# Patient Record
Sex: FEMALE | Race: BLACK OR AFRICAN AMERICAN | NOT HISPANIC OR LATINO | Employment: OTHER | URBAN - METROPOLITAN AREA
[De-identification: names, ages, dates, MRNs, and addresses within clinical notes are randomized per-mention and may not be internally consistent; named-entity substitution may affect disease eponyms.]

---

## 2017-02-08 ENCOUNTER — PROCEDURE VISIT (OUTPATIENT)
Dept: NEUROLOGY | Facility: CLINIC | Age: 47
End: 2017-02-08

## 2017-02-08 DIAGNOSIS — R20.2 NUMBNESS AND TINGLING: Primary | ICD-10-CM

## 2017-02-08 DIAGNOSIS — R20.0 NUMBNESS AND TINGLING: Primary | ICD-10-CM

## 2017-02-08 NOTE — PROGRESS NOTES
Procedure   EMG & Nerve Conduction Test  Date/Time: 2/8/2017 10:47 AM  Performed by: MILAD LAI  Authorized by: MILAD LAI   Consent: Verbal consent obtained.                      Diagnoses and all orders for this visit:    Numbness and tingling  -     EMG & Nerve Conduction Test

## 2017-02-09 ENCOUNTER — PROCEDURE VISIT (OUTPATIENT)
Dept: NEUROLOGY | Facility: CLINIC | Age: 47
End: 2017-02-09

## 2017-02-09 DIAGNOSIS — R20.0 NUMBNESS AND TINGLING: Primary | ICD-10-CM

## 2017-02-09 DIAGNOSIS — R20.2 NUMBNESS AND TINGLING: Primary | ICD-10-CM

## 2017-02-09 PROCEDURE — 95886 MUSC TEST DONE W/N TEST COMP: CPT | Performed by: PSYCHIATRY & NEUROLOGY

## 2017-02-09 PROCEDURE — 95911 NRV CNDJ TEST 9-10 STUDIES: CPT | Performed by: PSYCHIATRY & NEUROLOGY

## 2017-02-09 NOTE — PROGRESS NOTES
"Procedure   EMG & Nerve Conduction Test  Date/Time: 2/9/2017 8:17 AM  Performed by: MILAD LAI  Authorized by: MILAD LAI   Consent: Verbal consent obtained.            [facility]   [address], [address]  [place]  [address]   Phone: [phone]  FAX: [phone]           Patient: lisbet dan YOB: 1970  Gender: Female  Reason for study: see below in history section      Visit Date: 2/9/2017 07:22  Age: 46 Years 7 Months Old  Examining Physician: Holly       The patient has a chief complaint and history of bilateral, upper extremity, numbness,    The patient is referred to have this problem evaluated today with EMG and nerve conduction studies.  The performing physician also acted as a technician on this study.  Please note that in the table \"NR\" stands for \"no response\" therefore testing was performed, but no response was elicited.    A brief neurological exam, revealed no abnormalities in muscle bulk strength reflexes and sensation.      Sensory NCS      Nerve / Sites Rec. Site Distance Onset Lat NP Amp Onset Gama     cm ms µV m/s   L Median, Ulnar - Ring finger comparison      Median Wrist Ring finger 14 3.9 1.2 36      Ulnar Wrist Ring finger 14 2.7 2.8 52   L Median, Radial - Thumb comparison      Median Wrist Thumb 10 4.4 1.3 23      Radial Wrist Thumb 10 1.8 14.1 56           R Median, Radial - Thumb comparison      Median Wrist Thumb 10 2.4 1.2 42      Radial Wrist Thumb 10 1.9 6.2 52       Motor NCS      Nerve / Sites Muscle Distance Latency Amplitude Velocity     cm ms mV m/s   L Median - APB      Wrist APB 8 6.93 5.0       Elbow APB 21 10.26 4.8 63   R Median - APB      Wrist APB 8 7.71 4.6       Elbow APB 21 11.61 4.6 54   L Ulnar - ADM      Wrist ADM 8 3.02 6.7       B.Elbow ADM 12 5.36 6.5 51      A.Elbow ADM 20 8.85 6.1 57   R Ulnar - ADM      Wrist ADM 8 2.97 6.7       B.Elbow ADM 12 5.21 6.5 54      A.Elbow ADM 20 8.70 6.1 57       F  " Wave      Nerve F-min    ms   R Ulnar - ADM 32       EMG         EMG Summary Table     Spontaneous MUAP Recruitment    IA Fib PSW Fasc H.F. Amp Dur. PPP Pattern   L. ABD POLL BREVIS N None None None None N N N N   L. FIRST D INTEROSS N None None None None N N N N   L. FLEX POLL LONG N None None None None N N N N   L. PRON QUAD N None None None None N N N N   L. PRON TERES N None None None None N N N N   L. TRICEPS N None None None None N N N N   L. DELTOID N None None None None N N N N   L. CERV PSPINAL N None None None None N N N N   R. CERV PSPINAL N None None None None N N N N   R. TRICEPS N None None None None N N N N   R. DELTOID N None None None None N N N N   R. ABD POLL BREVIS N None None None None N N N N   R. FIRST D INTEROSS N None None None None N N N N   R. FLEX POLL LONG N None None None None N N N N   R. PRON QUAD N None None None None N N N N   R. PRON TERES N None None None None N N N N     1.) SNAPs showed increased radial to median latency difference (>0.4m/s)  2.) CMAPs showed slowing of the median nerves over the wrist  3.) F-waves were normal where recordable  4.) EMG was normal in all muscles tested.    These findings are compatible with    1.) moderate bilateral carpal tunnel syndrome    There is currently no electrodiagnostic evidence of a cervical radiculopathy        All NCS were performed at 32C or greater.    The referring health care provider will discuss this report and possible further diagnostic and management options with the patient.      Akbar Barrera M.D.                       Diagnoses and all orders for this visit:    Numbness and tingling  -     EMG & Nerve Conduction Test

## 2017-06-09 ENCOUNTER — TRANSCRIBE ORDERS (OUTPATIENT)
Dept: ADMINISTRATIVE | Facility: HOSPITAL | Age: 47
End: 2017-06-09

## 2017-06-09 DIAGNOSIS — R94.39 ABNORMAL STRESS TEST: Primary | ICD-10-CM

## 2017-06-16 ENCOUNTER — HOSPITAL ENCOUNTER (OUTPATIENT)
Facility: HOSPITAL | Age: 47
Setting detail: HOSPITAL OUTPATIENT SURGERY
Discharge: HOME | End: 2017-06-16
Attending: INTERNAL MEDICINE | Admitting: INTERNAL MEDICINE

## 2017-06-16 VITALS
RESPIRATION RATE: 18 BRPM | WEIGHT: 283.73 LBS | TEMPERATURE: 96.7 F | HEIGHT: 64 IN | SYSTOLIC BLOOD PRESSURE: 130 MMHG | DIASTOLIC BLOOD PRESSURE: 102 MMHG | HEART RATE: 64 BPM | BODY MASS INDEX: 48.44 KG/M2 | OXYGEN SATURATION: 99 %

## 2017-06-16 DIAGNOSIS — R94.39 ABNORMAL STRESS TEST: ICD-10-CM

## 2017-06-16 LAB
ANION GAP SERPL CALCULATED.3IONS-SCNC: 5 MMOL/L (ref 3–11)
BUN BLD-MCNC: 9 MG/DL (ref 9–23)
BUN/CREAT SERPL: 11.3 (ref 7–25)
CALCIUM SPEC-SCNC: 9.4 MG/DL (ref 8.7–10.4)
CHLORIDE SERPL-SCNC: 108 MMOL/L (ref 99–109)
CO2 SERPL-SCNC: 28 MMOL/L (ref 20–31)
CREAT BLD-MCNC: 0.8 MG/DL (ref 0.6–1.3)
DEPRECATED RDW RBC AUTO: 44.8 FL (ref 37–54)
ERYTHROCYTE [DISTWIDTH] IN BLOOD BY AUTOMATED COUNT: 13.8 % (ref 11.3–14.5)
GFR SERPL CREATININE-BSD FRML MDRD: 94 ML/MIN/1.73
GLUCOSE BLD-MCNC: 131 MG/DL (ref 70–100)
GLUCOSE BLDC GLUCOMTR-MCNC: 122 MG/DL (ref 70–130)
HCT VFR BLD AUTO: 39.1 % (ref 34.5–44)
HGB BLD-MCNC: 12.3 G/DL (ref 11.5–15.5)
MCH RBC QN AUTO: 27.7 PG (ref 27–31)
MCHC RBC AUTO-ENTMCNC: 31.5 G/DL (ref 32–36)
MCV RBC AUTO: 88.1 FL (ref 80–99)
PLATELET # BLD AUTO: 273 10*3/MM3 (ref 150–450)
PMV BLD AUTO: 10.9 FL (ref 6–12)
POTASSIUM BLD-SCNC: 2.7 MMOL/L (ref 3.5–5.5)
RBC # BLD AUTO: 4.44 10*6/MM3 (ref 3.89–5.14)
SODIUM BLD-SCNC: 141 MMOL/L (ref 132–146)
WBC NRBC COR # BLD: 9.02 10*3/MM3 (ref 3.5–10.8)

## 2017-06-16 PROCEDURE — 93458 L HRT ARTERY/VENTRICLE ANGIO: CPT | Performed by: INTERNAL MEDICINE

## 2017-06-16 PROCEDURE — 82962 GLUCOSE BLOOD TEST: CPT

## 2017-06-16 PROCEDURE — 85027 COMPLETE CBC AUTOMATED: CPT | Performed by: INTERNAL MEDICINE

## 2017-06-16 PROCEDURE — C1894 INTRO/SHEATH, NON-LASER: HCPCS | Performed by: INTERNAL MEDICINE

## 2017-06-16 PROCEDURE — 0 IOPAMIDOL PER 1 ML: Performed by: INTERNAL MEDICINE

## 2017-06-16 PROCEDURE — 25010000002 FENTANYL CITRATE (PF) 100 MCG/2ML SOLUTION: Performed by: INTERNAL MEDICINE

## 2017-06-16 PROCEDURE — C1769 GUIDE WIRE: HCPCS | Performed by: INTERNAL MEDICINE

## 2017-06-16 PROCEDURE — 80048 BASIC METABOLIC PNL TOTAL CA: CPT | Performed by: INTERNAL MEDICINE

## 2017-06-16 PROCEDURE — 25010000002 MIDAZOLAM PER 1 MG: Performed by: INTERNAL MEDICINE

## 2017-06-16 PROCEDURE — 36415 COLL VENOUS BLD VENIPUNCTURE: CPT

## 2017-06-16 RX ORDER — ACETAMINOPHEN 325 MG/1
650 TABLET ORAL EVERY 4 HOURS PRN
Status: DISCONTINUED | OUTPATIENT
Start: 2017-06-16 | End: 2017-06-16 | Stop reason: HOSPADM

## 2017-06-16 RX ORDER — MIDAZOLAM HYDROCHLORIDE 1 MG/ML
INJECTION INTRAMUSCULAR; INTRAVENOUS AS NEEDED
Status: DISCONTINUED | OUTPATIENT
Start: 2017-06-16 | End: 2017-06-16 | Stop reason: HOSPADM

## 2017-06-16 RX ORDER — TOPIRAMATE 50 MG/1
50 TABLET, FILM COATED ORAL DAILY
COMMUNITY
End: 2020-08-11

## 2017-06-16 RX ORDER — PROPRANOLOL HYDROCHLORIDE 10 MG/1
10 TABLET ORAL DAILY PRN
COMMUNITY
End: 2019-07-29

## 2017-06-16 RX ORDER — NALOXONE HCL 0.4 MG/ML
0.4 VIAL (ML) INJECTION
Status: DISCONTINUED | OUTPATIENT
Start: 2017-06-16 | End: 2017-06-16 | Stop reason: HOSPADM

## 2017-06-16 RX ORDER — MORPHINE SULFATE 2 MG/ML
1 INJECTION, SOLUTION INTRAMUSCULAR; INTRAVENOUS EVERY 4 HOURS PRN
Status: DISCONTINUED | OUTPATIENT
Start: 2017-06-16 | End: 2017-06-16 | Stop reason: HOSPADM

## 2017-06-16 RX ORDER — LIDOCAINE HYDROCHLORIDE 10 MG/ML
INJECTION, SOLUTION INFILTRATION; PERINEURAL AS NEEDED
Status: DISCONTINUED | OUTPATIENT
Start: 2017-06-16 | End: 2017-06-16 | Stop reason: HOSPADM

## 2017-06-16 RX ORDER — TIZANIDINE 4 MG/1
4 TABLET ORAL NIGHTLY PRN
COMMUNITY
End: 2022-06-15

## 2017-06-16 RX ORDER — FENTANYL CITRATE 50 UG/ML
INJECTION, SOLUTION INTRAMUSCULAR; INTRAVENOUS AS NEEDED
Status: DISCONTINUED | OUTPATIENT
Start: 2017-06-16 | End: 2017-06-16 | Stop reason: HOSPADM

## 2017-06-16 RX ORDER — SODIUM CHLORIDE 9 MG/ML
250 INJECTION, SOLUTION INTRAVENOUS CONTINUOUS
Status: DISCONTINUED | OUTPATIENT
Start: 2017-06-16 | End: 2017-06-16

## 2017-06-16 RX ORDER — ONDANSETRON 4 MG/1
4 TABLET, FILM COATED ORAL EVERY 6 HOURS PRN
COMMUNITY
End: 2020-10-14

## 2017-06-16 RX ORDER — ALPRAZOLAM 0.5 MG/1
0.5 TABLET ORAL DAILY PRN
COMMUNITY
End: 2020-08-11

## 2017-06-16 RX ORDER — DEXTROSE MONOHYDRATE 25 G/50ML
25 INJECTION, SOLUTION INTRAVENOUS
Status: DISCONTINUED | OUTPATIENT
Start: 2017-06-16 | End: 2017-06-16 | Stop reason: HOSPADM

## 2017-06-16 RX ORDER — ASPIRIN 325 MG
325 TABLET, DELAYED RELEASE (ENTERIC COATED) ORAL DAILY
Status: DISCONTINUED | OUTPATIENT
Start: 2017-06-16 | End: 2017-06-16 | Stop reason: HOSPADM

## 2017-06-16 RX ORDER — LOSARTAN POTASSIUM AND HYDROCHLOROTHIAZIDE 25; 100 MG/1; MG/1
1 TABLET ORAL DAILY
COMMUNITY

## 2017-06-16 RX ORDER — GABAPENTIN 800 MG/1
800 TABLET ORAL 2 TIMES DAILY
COMMUNITY

## 2017-06-16 RX ORDER — PRAVASTATIN SODIUM 40 MG
40 TABLET ORAL DAILY
COMMUNITY
End: 2019-07-29

## 2017-06-16 RX ORDER — TEMAZEPAM 7.5 MG/1
7.5 CAPSULE ORAL NIGHTLY PRN
Status: DISCONTINUED | OUTPATIENT
Start: 2017-06-16 | End: 2017-06-16 | Stop reason: HOSPADM

## 2017-06-16 RX ORDER — ALPRAZOLAM 0.25 MG/1
0.25 TABLET ORAL 3 TIMES DAILY PRN
Status: DISCONTINUED | OUTPATIENT
Start: 2017-06-16 | End: 2017-06-16 | Stop reason: HOSPADM

## 2017-06-16 RX ORDER — FLUTICASONE PROPIONATE 50 MCG
2 SPRAY, SUSPENSION (ML) NASAL DAILY
COMMUNITY

## 2017-06-16 RX ORDER — POTASSIUM CHLORIDE 750 MG/1
60 CAPSULE, EXTENDED RELEASE ORAL ONCE
Status: COMPLETED | OUTPATIENT
Start: 2017-06-16 | End: 2017-06-16

## 2017-06-16 RX ORDER — RANITIDINE 150 MG/1
150 TABLET ORAL DAILY
COMMUNITY
End: 2020-10-14

## 2017-06-16 RX ORDER — NICOTINE POLACRILEX 4 MG
15 LOZENGE BUCCAL
Status: DISCONTINUED | OUTPATIENT
Start: 2017-06-16 | End: 2017-06-16 | Stop reason: HOSPADM

## 2017-06-16 RX ORDER — TRAZODONE HYDROCHLORIDE 50 MG/1
50 TABLET ORAL NIGHTLY
COMMUNITY
End: 2022-06-15

## 2017-06-16 RX ORDER — VENLAFAXINE 37.5 MG/1
37.5 TABLET ORAL DAILY
COMMUNITY
End: 2020-08-11

## 2017-06-16 RX ORDER — DILTIAZEM HYDROCHLORIDE 60 MG/1
60 TABLET, FILM COATED ORAL DAILY
COMMUNITY

## 2017-06-16 RX ORDER — HYDROCODONE BITARTRATE AND ACETAMINOPHEN 5; 325 MG/1; MG/1
1 TABLET ORAL EVERY 4 HOURS PRN
Status: DISCONTINUED | OUTPATIENT
Start: 2017-06-16 | End: 2017-06-16 | Stop reason: HOSPADM

## 2017-06-16 RX ORDER — OXYCODONE HYDROCHLORIDE 10 MG/1
10 TABLET ORAL EVERY 8 HOURS PRN
COMMUNITY

## 2017-06-16 RX ORDER — ESTRADIOL 1 MG/1
1 TABLET ORAL DAILY
COMMUNITY

## 2017-06-16 RX ORDER — POTASSIUM CHLORIDE 1.5 G/1.77G
60 POWDER, FOR SOLUTION ORAL ONCE
Status: DISCONTINUED | OUTPATIENT
Start: 2017-06-16 | End: 2017-06-16 | Stop reason: SDUPTHER

## 2017-06-16 RX ADMIN — POTASSIUM CHLORIDE 60 MEQ: 750 CAPSULE, EXTENDED RELEASE ORAL at 08:36

## 2017-06-16 RX ADMIN — ASPIRIN 325 MG: 325 TABLET, DELAYED RELEASE ORAL at 08:36

## 2017-06-16 NOTE — PLAN OF CARE
Problem: Patient Care Overview (Adult)  Goal: Plan of Care Review  Outcome: Ongoing (interventions implemented as appropriate)    06/16/17 1243   Coping/Psychosocial Response Interventions   Plan Of Care Reviewed With patient;family   Patient Care Overview   Progress improving   Outcome Evaluation   Outcome Summary/Follow up Plan PT AND FAMILY ABLE TO VERBALIZE UNDERSTANDING OF DC INSTRUCTIONS- NO QUESTIONS AT THIS TIME- SITE STABLE AND AMBULATING WITH NO ISSUES.       Goal: Discharge Needs Assessment  Outcome: Ongoing (interventions implemented as appropriate)    06/16/17 1243   Discharge Needs Assessment   Concerns To Be Addressed no discharge needs identified         Problem: Cardiac Catheterization with/without PCI (Adult)  Goal: Signs and Symptoms of Listed Potential Problems Will be Absent or Manageable (Cardiac Catheterization with/without PCI)  Outcome: Ongoing (interventions implemented as appropriate)    06/16/17 1243   Cardiac Catheterization with/without PCI   Problems Assessed (Cardiac Catheterization) all   Problems Present (Cardiac Catheterization) none

## 2019-07-29 ENCOUNTER — APPOINTMENT (OUTPATIENT)
Dept: GENERAL RADIOLOGY | Facility: HOSPITAL | Age: 49
End: 2019-07-29

## 2019-07-29 ENCOUNTER — APPOINTMENT (OUTPATIENT)
Dept: CT IMAGING | Facility: HOSPITAL | Age: 49
End: 2019-07-29

## 2019-07-29 ENCOUNTER — HOSPITAL ENCOUNTER (EMERGENCY)
Facility: HOSPITAL | Age: 49
Discharge: HOME | End: 2019-07-29
Attending: EMERGENCY MEDICINE | Admitting: EMERGENCY MEDICINE

## 2019-07-29 VITALS
SYSTOLIC BLOOD PRESSURE: 120 MMHG | HEIGHT: 64 IN | HEART RATE: 72 BPM | WEIGHT: 278 LBS | OXYGEN SATURATION: 99 % | TEMPERATURE: 98.7 F | RESPIRATION RATE: 17 BRPM | DIASTOLIC BLOOD PRESSURE: 73 MMHG | BODY MASS INDEX: 47.46 KG/M2

## 2019-07-29 DIAGNOSIS — R07.9 CHEST PAIN, UNSPECIFIED TYPE: Primary | ICD-10-CM

## 2019-07-29 LAB
ALBUMIN SERPL-MCNC: 3.9 G/DL (ref 3.5–5.2)
ALBUMIN/GLOB SERPL: 1.1 G/DL
ALP SERPL-CCNC: 81 U/L (ref 39–117)
ALT SERPL W P-5'-P-CCNC: 19 U/L (ref 1–33)
ANION GAP SERPL CALCULATED.3IONS-SCNC: 13 MMOL/L (ref 5–15)
AST SERPL-CCNC: 21 U/L (ref 1–32)
BASOPHILS # BLD AUTO: 0.05 10*3/MM3 (ref 0–0.2)
BASOPHILS NFR BLD AUTO: 0.6 % (ref 0–1.5)
BILIRUB SERPL-MCNC: 0.3 MG/DL (ref 0.2–1.2)
BUN BLD-MCNC: 10 MG/DL (ref 6–20)
BUN/CREAT SERPL: 12.2 (ref 7–25)
CALCIUM SPEC-SCNC: 9.6 MG/DL (ref 8.6–10.5)
CHLORIDE SERPL-SCNC: 102 MMOL/L (ref 98–107)
CO2 SERPL-SCNC: 24 MMOL/L (ref 22–29)
CREAT BLD-MCNC: 0.82 MG/DL (ref 0.57–1)
DEPRECATED RDW RBC AUTO: 43.6 FL (ref 37–54)
EOSINOPHIL # BLD AUTO: 0.16 10*3/MM3 (ref 0–0.4)
EOSINOPHIL NFR BLD AUTO: 2 % (ref 0.3–6.2)
ERYTHROCYTE [DISTWIDTH] IN BLOOD BY AUTOMATED COUNT: 13.2 % (ref 12.3–15.4)
GFR SERPL CREATININE-BSD FRML MDRD: 91 ML/MIN/1.73
GLOBULIN UR ELPH-MCNC: 3.7 GM/DL
GLUCOSE BLD-MCNC: 132 MG/DL (ref 65–99)
HCT VFR BLD AUTO: 41.1 % (ref 34–46.6)
HGB BLD-MCNC: 12.7 G/DL (ref 12–15.9)
HOLD SPECIMEN: NORMAL
IMM GRANULOCYTES # BLD AUTO: 0.02 10*3/MM3 (ref 0–0.05)
IMM GRANULOCYTES NFR BLD AUTO: 0.2 % (ref 0–0.5)
LIPASE SERPL-CCNC: 29 U/L (ref 13–60)
LYMPHOCYTES # BLD AUTO: 3.08 10*3/MM3 (ref 0.7–3.1)
LYMPHOCYTES NFR BLD AUTO: 38.3 % (ref 19.6–45.3)
MCH RBC QN AUTO: 27.6 PG (ref 26.6–33)
MCHC RBC AUTO-ENTMCNC: 30.9 G/DL (ref 31.5–35.7)
MCV RBC AUTO: 89.3 FL (ref 79–97)
MONOCYTES # BLD AUTO: 0.64 10*3/MM3 (ref 0.1–0.9)
MONOCYTES NFR BLD AUTO: 8 % (ref 5–12)
NEUTROPHILS # BLD AUTO: 4.09 10*3/MM3 (ref 1.7–7)
NEUTROPHILS NFR BLD AUTO: 50.9 % (ref 42.7–76)
NRBC BLD AUTO-RTO: 0 /100 WBC (ref 0–0.2)
NT-PROBNP SERPL-MCNC: 113.5 PG/ML (ref 5–450)
PLATELET # BLD AUTO: 326 10*3/MM3 (ref 140–450)
PMV BLD AUTO: 11 FL (ref 6–12)
POTASSIUM BLD-SCNC: 3.2 MMOL/L (ref 3.5–5.2)
PROT SERPL-MCNC: 7.6 G/DL (ref 6–8.5)
RBC # BLD AUTO: 4.6 10*6/MM3 (ref 3.77–5.28)
SODIUM BLD-SCNC: 139 MMOL/L (ref 136–145)
TROPONIN T SERPL-MCNC: <0.01 NG/ML (ref 0–0.03)
TROPONIN T SERPL-MCNC: <0.01 NG/ML (ref 0–0.03)
WBC NRBC COR # BLD: 8.04 10*3/MM3 (ref 3.4–10.8)
WHOLE BLOOD HOLD SPECIMEN: NORMAL
WHOLE BLOOD HOLD SPECIMEN: NORMAL

## 2019-07-29 PROCEDURE — 93005 ELECTROCARDIOGRAM TRACING: CPT | Performed by: EMERGENCY MEDICINE

## 2019-07-29 PROCEDURE — 80053 COMPREHEN METABOLIC PANEL: CPT | Performed by: EMERGENCY MEDICINE

## 2019-07-29 PROCEDURE — 83690 ASSAY OF LIPASE: CPT | Performed by: EMERGENCY MEDICINE

## 2019-07-29 PROCEDURE — 96375 TX/PRO/DX INJ NEW DRUG ADDON: CPT

## 2019-07-29 PROCEDURE — 25010000002 ONDANSETRON PER 1 MG: Performed by: EMERGENCY MEDICINE

## 2019-07-29 PROCEDURE — 96374 THER/PROPH/DIAG INJ IV PUSH: CPT

## 2019-07-29 PROCEDURE — 85025 COMPLETE CBC W/AUTO DIFF WBC: CPT | Performed by: EMERGENCY MEDICINE

## 2019-07-29 PROCEDURE — 83880 ASSAY OF NATRIURETIC PEPTIDE: CPT | Performed by: EMERGENCY MEDICINE

## 2019-07-29 PROCEDURE — 71045 X-RAY EXAM CHEST 1 VIEW: CPT

## 2019-07-29 PROCEDURE — 99285 EMERGENCY DEPT VISIT HI MDM: CPT

## 2019-07-29 PROCEDURE — 84484 ASSAY OF TROPONIN QUANT: CPT | Performed by: EMERGENCY MEDICINE

## 2019-07-29 RX ORDER — NITROGLYCERIN 0.4 MG/1
0.4 TABLET SUBLINGUAL
Status: DISCONTINUED | OUTPATIENT
Start: 2019-07-29 | End: 2019-07-29 | Stop reason: HOSPADM

## 2019-07-29 RX ORDER — NITROGLYCERIN 0.4 MG/1
0.4 TABLET SUBLINGUAL
Qty: 90 TABLET | Refills: 0 | Status: SHIPPED | OUTPATIENT
Start: 2019-07-29

## 2019-07-29 RX ORDER — SODIUM CHLORIDE 0.9 % (FLUSH) 0.9 %
10 SYRINGE (ML) INJECTION AS NEEDED
Status: DISCONTINUED | OUTPATIENT
Start: 2019-07-29 | End: 2019-07-29 | Stop reason: HOSPADM

## 2019-07-29 RX ORDER — ATORVASTATIN CALCIUM 40 MG/1
40 TABLET, FILM COATED ORAL DAILY
COMMUNITY
End: 2020-10-14

## 2019-07-29 RX ORDER — ONDANSETRON 2 MG/ML
4 INJECTION INTRAMUSCULAR; INTRAVENOUS ONCE
Status: COMPLETED | OUTPATIENT
Start: 2019-07-29 | End: 2019-07-29

## 2019-07-29 RX ORDER — ASPIRIN 81 MG/1
324 TABLET, CHEWABLE ORAL ONCE
Status: COMPLETED | OUTPATIENT
Start: 2019-07-29 | End: 2019-07-29

## 2019-07-29 RX ORDER — ALUMINA, MAGNESIA, AND SIMETHICONE 2400; 2400; 240 MG/30ML; MG/30ML; MG/30ML
15 SUSPENSION ORAL ONCE
Status: COMPLETED | OUTPATIENT
Start: 2019-07-29 | End: 2019-07-29

## 2019-07-29 RX ORDER — FAMOTIDINE 10 MG/ML
20 INJECTION, SOLUTION INTRAVENOUS ONCE
Status: COMPLETED | OUTPATIENT
Start: 2019-07-29 | End: 2019-07-29

## 2019-07-29 RX ORDER — LIDOCAINE HYDROCHLORIDE 20 MG/ML
15 SOLUTION OROPHARYNGEAL ONCE
Status: COMPLETED | OUTPATIENT
Start: 2019-07-29 | End: 2019-07-29

## 2019-07-29 RX ADMIN — FAMOTIDINE 20 MG: 10 INJECTION, SOLUTION INTRAVENOUS at 12:44

## 2019-07-29 RX ADMIN — NITROGLYCERIN 0.4 MG: 0.4 TABLET SUBLINGUAL at 14:50

## 2019-07-29 RX ADMIN — ASPIRIN 81 MG 324 MG: 81 TABLET ORAL at 12:09

## 2019-07-29 RX ADMIN — ALUMINUM HYDROXIDE, MAGNESIUM HYDROXIDE, AND DIMETHICONE 15 ML: 400; 400; 40 SUSPENSION ORAL at 12:40

## 2019-07-29 RX ADMIN — LIDOCAINE HYDROCHLORIDE 15 ML: 20 SOLUTION ORAL; TOPICAL at 12:41

## 2019-07-29 RX ADMIN — ONDANSETRON 4 MG: 2 INJECTION INTRAMUSCULAR; INTRAVENOUS at 12:42

## 2020-08-04 ENCOUNTER — TRANSCRIBE ORDERS (OUTPATIENT)
Dept: ADMINISTRATIVE | Facility: HOSPITAL | Age: 50
End: 2020-08-04

## 2020-08-04 DIAGNOSIS — R51.9 NONINTRACTABLE HEADACHE, UNSPECIFIED CHRONICITY PATTERN, UNSPECIFIED HEADACHE TYPE: Primary | ICD-10-CM

## 2020-08-04 DIAGNOSIS — R29.6 FALLS FREQUENTLY: ICD-10-CM

## 2020-08-05 ENCOUNTER — APPOINTMENT (OUTPATIENT)
Dept: MRI IMAGING | Facility: HOSPITAL | Age: 50
End: 2020-08-05

## 2020-08-11 ENCOUNTER — OFFICE VISIT (OUTPATIENT)
Dept: NEUROLOGY | Facility: CLINIC | Age: 50
End: 2020-08-11

## 2020-08-11 VITALS
TEMPERATURE: 97.1 F | HEIGHT: 64 IN | WEIGHT: 239 LBS | DIASTOLIC BLOOD PRESSURE: 78 MMHG | OXYGEN SATURATION: 100 % | SYSTOLIC BLOOD PRESSURE: 118 MMHG | HEART RATE: 69 BPM | BODY MASS INDEX: 40.8 KG/M2

## 2020-08-11 DIAGNOSIS — G47.33 OBSTRUCTIVE SLEEP APNEA: ICD-10-CM

## 2020-08-11 DIAGNOSIS — G44.209 TENSION HEADACHE: Primary | ICD-10-CM

## 2020-08-11 DIAGNOSIS — G93.2 IIH (IDIOPATHIC INTRACRANIAL HYPERTENSION): ICD-10-CM

## 2020-08-11 PROCEDURE — 99204 OFFICE O/P NEW MOD 45 MIN: CPT | Performed by: PSYCHIATRY & NEUROLOGY

## 2020-08-11 RX ORDER — TOPIRAMATE 50 MG/1
50 TABLET, FILM COATED ORAL 2 TIMES DAILY
Qty: 60 TABLET | Refills: 5 | Status: SHIPPED | OUTPATIENT
Start: 2020-08-11 | End: 2020-10-14 | Stop reason: SDUPTHER

## 2020-08-11 RX ORDER — METHYLPREDNISOLONE 4 MG/1
TABLET ORAL
Qty: 1 EACH | Refills: 0 | Status: SHIPPED | OUTPATIENT
Start: 2020-08-11 | End: 2020-10-14

## 2020-08-11 RX ORDER — EZETIMIBE 10 MG/1
10 TABLET ORAL DAILY
COMMUNITY
End: 2022-08-19

## 2020-08-11 RX ORDER — AMITRIPTYLINE HYDROCHLORIDE 10 MG/1
10 TABLET, FILM COATED ORAL NIGHTLY
COMMUNITY

## 2020-08-11 NOTE — PROGRESS NOTES
Subjective:    CC: Kelsi Costa is seen today in consultation at the request of Celeste Sadler* for Headache (NP)       HPI:  50-year-old female with a past medical history of arthritis, chronic neck and low back pain, anxiety, hypertension, hyperlipidemia, diabetes mellitus type 2 presents with headaches.  As per patient she started having headaches in the past however a few months ago they started to get worse.  She has also been having dizziness and balance problems.  Currently has a headache about 3-4 times a week which is either on the right side or all over, dull in nature with photophobia but no nausea or vomiting.  She typically takes an Excedrin for the pain.  Also has blurred vision in the mornings as well as ringing in her ears even when she does not have a headache.  Was diagnosed with pseudotumor cerebri several years ago and underwent her last lumbar puncture about 6 years ago.  However she states that excessive spinal fluid was taken out at that time which caused her to have side effects.  Was started on Topamax and is currently taking only 50 mg day as well as amitriptyline 10 mg nightly.  Also snores at night with excessive fatigue and daytime somnolence.  Is on morphine and oxycodone for her low back pain in addition to muscle relaxants and gabapentin for her neuropathy.  Last A1c was 6.8.  Has a MRI brain pending.    The following portions of the patient's history were reviewed today and updated as of 08/11/2020  : allergies, current medications, past family history, past medical history, past social history, past surgical history and problem list  These document will be scanned to patient's chart.      Current Outpatient Medications:   •  amitriptyline (ELAVIL) 10 MG tablet, Take 10 mg by mouth Every Night., Disp: , Rfl:   •  atorvastatin (LIPITOR) 40 MG tablet, Take 40 mg by mouth Daily., Disp: , Rfl:   •  budesonide (RHINOCORT ALLERGY) 32 MCG/ACT nasal spray, 1 spray into each  nostril 2 (Two) Times a Day., Disp: , Rfl:   •  diltiaZEM (CARDIZEM) 60 MG tablet, Take 60 mg by mouth Daily., Disp: , Rfl:   •  estradiol (ESTRACE) 1 MG tablet, Take 1 mg by mouth Daily., Disp: , Rfl:   •  ezetimibe (ZETIA) 10 MG tablet, Take 10 mg by mouth Daily., Disp: , Rfl:   •  fluticasone (FLONASE) 50 MCG/ACT nasal spray, 2 sprays into each nostril Daily., Disp: , Rfl:   •  Fluticasone Furoate-Vilanterol (BREO ELLIPTA) 200-25 MCG/INH aerosol powder , Inhale 2 puffs Daily., Disp: , Rfl:   •  gabapentin (NEURONTIN) 800 MG tablet, Take 800 mg by mouth 2 (two) times a day., Disp: , Rfl:   •  losartan-hydrochlorothiazide (HYZAAR) 100-25 MG per tablet, Take 1 tablet by mouth Daily., Disp: , Rfl:   •  metFORMIN (GLUCOPHAGE) 500 MG tablet, Take 500 mg by mouth 2 (Two) Times a Day With Meals., Disp: , Rfl:   •  Morphine Sulfate ER 15 MG tablet extended-release, Take 1 tablet by mouth Daily., Disp: , Rfl:   •  nitroglycerin (NITROSTAT) 0.4 MG SL tablet, Place 1 tablet under the tongue Every 5 (Five) Minutes As Needed for Chest Pain. Take no more than 3 doses in 15 minutes., Disp: 90 tablet, Rfl: 0  •  ondansetron (ZOFRAN) 4 MG tablet, Take 4 mg by mouth Every 6 (Six) Hours As Needed for Nausea or Vomiting., Disp: , Rfl:   •  oxyCODONE (ROXICODONE) 10 MG tablet, Take 10 mg by mouth Every 8 (Eight) Hours As Needed for Moderate Pain (4-6)., Disp: , Rfl:   •  raNITIdine (ZANTAC) 150 MG tablet, Take 150 mg by mouth Daily., Disp: , Rfl:   •  tiotropium (SPIRIVA HANDIHALER) 18 MCG per inhalation capsule, Place 1 capsule into inhaler and inhale Daily., Disp: , Rfl:   •  tiZANidine (ZANAFLEX) 4 MG tablet, Take 4 mg by mouth At Night As Needed for Muscle Spasms., Disp: , Rfl:   •  traZODone (DESYREL) 50 MG tablet, Take 50 mg by mouth Every Night., Disp: , Rfl:   •  methylPREDNISolone (MEDROL, CHRISSY,) 4 MG tablet, Take as directed on package instructions., Disp: 1 each, Rfl: 0  •  topiramate (Topamax) 50 MG tablet, Take 1 tablet by  "mouth 2 (Two) Times a Day., Disp: 60 tablet, Rfl: 5   Past Medical History:   Diagnosis Date   • Angina at rest (CMS/HCC)    • Asthma    • Back pain    • Diabetes mellitus (CMS/HCC)    • Fibromyalgia    • Hyperlipidemia    • Hypertension    • Mitral valve prolapse    • Vertigo       Past Surgical History:   Procedure Laterality Date   • CARDIAC CATHETERIZATION N/A 2017    Procedure: Left Heart Cath;  Surgeon: Darnell Allen MD;  Location: [facility] CATH INVASIVE LOCATION;  Service:    •  SECTION     • FEMORAL ARTERY REPAIR     • HYSTERECTOMY     • KNEE ARTHROPLASTY, PARTIAL REPLACEMENT     • TOTAL KNEE ARTHROPLASTY Right       No family history on file.   Social History     Socioeconomic History   • Marital status:      Spouse name: Not on file   • Number of children: Not on file   • Years of education: Not on file   • Highest education level: Not on file   Tobacco Use   • Smoking status: Current Every Day Smoker     Types: Cigars     Start date: 2017   • Smokeless tobacco: Never Used   Substance and Sexual Activity   • Alcohol use: Yes     Alcohol/week: 2.0 standard drinks     Types: 2 Glasses of wine per week   • Drug use: No   • Sexual activity: Defer     Review of Systems   Neurological: Positive for headache.   Psychiatric/Behavioral: The patient is nervous/anxious.    All other systems reviewed and are negative.      Objective:    /78   Pulse 69   Temp 97.1 °F (36.2 °C)   Ht 162.6 cm (64\")   Wt 108 kg (239 lb)   SpO2 100%   BMI 41.02 kg/m²     Neurology Exam:    General apperance: Obese    Mental status: Alert, awake and oriented to time place and person.    Recent and Remote memory: Intact.    Attention span and Concentration: Normal.     Language and Speech: Intact- No dysarthria.    Fluency, Naming , Repitition and Comprehension:  Intact    Cranial Nerves:   CN II: Visual fields are full. Intact. Fundi - Normal, No papillederma, Pupils - HUMERA  CN III, IV and VI: " Extraocular movements are intact. Normal saccades.   CN V: Facial sensation is intact.   CN VII: Muscles of facial expression reveal no asymmetry. Intact.   CN VIII: Hearing is intact. Whispered voice intact.   CN IX and X: Palate elevates symmetrically. Intact  CN XI: Shoulder shrug is intact.   CN XII: Tongue is midline without evidence of atrophy or fasciculation.     Ophthalmoscopic exam of optic disc-normal    Motor:  Right UE muscle strength 5/5. Normal tone.     Left UE muscle strength 5/5. Normal tone.      Right LE muscle strength5/5. Normal tone.     Left LE muscle strength 5/5. Normal tone.      Sensory: Normal light touch, vibration and pinprick sensation bilaterally.    DTRs: 2+ bilaterally in upper and lower extremities.    Babinski: Negative bilaterally.    Co-ordination: Normal finger-to-nose, heel to shin B/L.    Rhomberg: Negative.    Gait: Normal.    Cardiovascular: Regular rate and rhythm without murmur, gallop or rub.    Assessment and Plan:  1. Tension headache  I feel patient has a combination of tension and rebound type headaches  I have told her to taper and stop Excedrin.  I will give her a Medrol Dosepak.  She should check her blood glucose frequently while on the Medrol pack  I have also asked her to increase her dose of Topamax to 50 mg twice a day.  She should keep her self well-hydrated while on the medication    2. IIH (idiopathic intracranial hypertension)  She has a history of pseudotumor cerebri.  Will give her a referral for ophthalmology as she has not had an eye examination recently  - Ambulatory Referral to Ophthalmology    3. Obstructive sleep apnea  Patient has symptoms of obstructive sleep apnea.  She could also have central sleep apnea due to the medications she is on  - Ambulatory Referral to Sleep Medicine       Return in about 6 weeks (around 9/22/2020).     I spent over 60 minutes with the patient face to face out of which over 50% (30 minutes) was spent in management,  instructions and education.     Carol Martinez MD

## 2020-08-19 ENCOUNTER — TELEPHONE (OUTPATIENT)
Dept: NEUROLOGY | Facility: CLINIC | Age: 50
End: 2020-08-19

## 2020-08-19 NOTE — TELEPHONE ENCOUNTER
Called and spoke to pt informing of scheduled appt with Dr. Gan 12/22 @ 8:30 and informed that visit will be 4-5 hr visit. Informed pt to bring most recent MRI and CT scans. Pt stated verbal understanding and appreciation. Thanks.

## 2020-08-20 ENCOUNTER — HOSPITAL ENCOUNTER (OUTPATIENT)
Dept: MRI IMAGING | Facility: HOSPITAL | Age: 50
Discharge: HOME | End: 2020-08-20
Admitting: NURSE PRACTITIONER

## 2020-08-20 DIAGNOSIS — R29.6 FALLS FREQUENTLY: ICD-10-CM

## 2020-08-20 DIAGNOSIS — R51.9 NONINTRACTABLE HEADACHE, UNSPECIFIED CHRONICITY PATTERN, UNSPECIFIED HEADACHE TYPE: ICD-10-CM

## 2020-08-20 PROCEDURE — 70551 MRI BRAIN STEM W/O DYE: CPT

## 2020-08-27 ENCOUNTER — TELEPHONE (OUTPATIENT)
Dept: NEUROLOGY | Facility: CLINIC | Age: 50
End: 2020-08-27

## 2020-08-27 NOTE — TELEPHONE ENCOUNTER
PT CALLED REQUESTING THE RESULTS FROM HER MRI OF THE BRAIN THAT SHE HAD ON 8/2020 AT  IN [place]. SHE ALSO STATES SHE HAS NOT HEARD ANYTHING REGARDING THE SLEEP REFERRAL THAT WAS PUT IN BY DR. POLANCO FOR THE PT. REFERRAL ALSO NOTATED. PLEASE GIVE PT A CALL BACK AT EARLIEST CONVENIENCE.       CALL BACK- [phone]

## 2020-08-28 ENCOUNTER — TELEPHONE (OUTPATIENT)
Dept: NEUROLOGY | Facility: CLINIC | Age: 50
End: 2020-08-28

## 2020-08-28 NOTE — TELEPHONE ENCOUNTER
Called and spoke to pt informing of MRI results. Informed pt of scheduled referral for 9/4 @ 9:30. Pt stated verbal understanding and appreciation. Thanks.

## 2020-08-28 NOTE — TELEPHONE ENCOUNTER
Can you please tell the patient that her MRI shows some changes such as compression of pituitary gland due to her idiopathic intracranial hypertension but no other acute abnormalities.  Also can you please check on her sleep medicine referral as I had put it in.

## 2020-09-04 ENCOUNTER — TELEMEDICINE (OUTPATIENT)
Dept: SLEEP MEDICINE | Facility: HOSPITAL | Age: 50
End: 2020-09-04

## 2020-09-04 DIAGNOSIS — E66.01 MORBID (SEVERE) OBESITY DUE TO EXCESS CALORIES (HCC): ICD-10-CM

## 2020-09-04 DIAGNOSIS — G47.33 OBSTRUCTIVE SLEEP APNEA, ADULT: ICD-10-CM

## 2020-09-04 DIAGNOSIS — R06.83 SNORING: Primary | ICD-10-CM

## 2020-09-04 PROCEDURE — 99203 OFFICE O/P NEW LOW 30 MIN: CPT | Performed by: INTERNAL MEDICINE

## 2020-09-04 NOTE — PATIENT INSTRUCTIONS

## 2020-09-05 NOTE — PROGRESS NOTES
Subjective   Kelsi Costa is a 50 y.o. female is being seen for consultation today at the request of Carol Martinez MD for the evaluation of snoring and possible sleep disordered breathing.  Her primary care provider is Alma MARTINEZ.  You have chosen to receive care through a telehealth visit.  Do you consent to use a video/audio connection for your medical care today? Yes    History of Present Illness  Patient has a history of headache and is been diagnosed with pseudotumor cerebri.  She also has a pituitary cyst.  She is referred for evaluation of sleep disordered breathing.  She apparently had a history of snoring for about 8 years.  She had apneas also noted for about 8 years.  She had a home sleep testing 4 years ago and was diagnosed with obstructive sleep apnea but only used CPAP for about a year and has been without therapy since then.  She admits her weight is up about 60 pounds.  She says she had difficulty using CPAP because she would fall asleep after taking her medications before she could put the mask on.  She has been waking up with a headache about 4 days/week.    She has a history of reflux and is on medications.  She has awaken with a dry mouth.  She denies ever breaking her nose.  She does admit to having nasal allergies.    She goes to bed about 10 PM.  She will fall asleep in 30 minutes.  She does not think she awakens that much at night.  She gets 4 to 6 hours of sleep.  She had often does not feel rested.  She has occasionally awaken gasping for breath.  She has had hypertension known for 10 years.  She has had diabetes known for 5 years.  She says she has been told she had angina.  Allergies   Allergen Reactions   • Levaquin [Levofloxacin] Itching and Rash          Current Outpatient Medications:   •  amitriptyline (ELAVIL) 10 MG tablet, Take 10 mg by mouth Every Night., Disp: , Rfl:   •  atorvastatin (LIPITOR) 40 MG tablet, Take 40 mg by mouth Daily., Disp: , Rfl:   •   budesonide (RHINOCORT ALLERGY) 32 MCG/ACT nasal spray, 1 spray into each nostril 2 (Two) Times a Day., Disp: , Rfl:   •  diltiaZEM (CARDIZEM) 60 MG tablet, Take 60 mg by mouth Daily., Disp: , Rfl:   •  estradiol (ESTRACE) 1 MG tablet, Take 1 mg by mouth Daily., Disp: , Rfl:   •  ezetimibe (ZETIA) 10 MG tablet, Take 10 mg by mouth Daily., Disp: , Rfl:   •  fluticasone (FLONASE) 50 MCG/ACT nasal spray, 2 sprays into each nostril Daily., Disp: , Rfl:   •  Fluticasone Furoate-Vilanterol (BREO ELLIPTA) 200-25 MCG/INH aerosol powder , Inhale 2 puffs Daily., Disp: , Rfl:   •  gabapentin (NEURONTIN) 800 MG tablet, Take 800 mg by mouth 2 (two) times a day., Disp: , Rfl:   •  losartan-hydrochlorothiazide (HYZAAR) 100-25 MG per tablet, Take 1 tablet by mouth Daily., Disp: , Rfl:   •  metFORMIN (GLUCOPHAGE) 500 MG tablet, Take 500 mg by mouth 2 (Two) Times a Day With Meals., Disp: , Rfl:   •  methylPREDNISolone (MEDROL, CHRISSY,) 4 MG tablet, Take as directed on package instructions., Disp: 1 each, Rfl: 0  •  Morphine Sulfate ER 15 MG tablet extended-release, Take 1 tablet by mouth Daily., Disp: , Rfl:   •  nitroglycerin (NITROSTAT) 0.4 MG SL tablet, Place 1 tablet under the tongue Every 5 (Five) Minutes As Needed for Chest Pain. Take no more than 3 doses in 15 minutes., Disp: 90 tablet, Rfl: 0  •  ondansetron (ZOFRAN) 4 MG tablet, Take 4 mg by mouth Every 6 (Six) Hours As Needed for Nausea or Vomiting., Disp: , Rfl:   •  oxyCODONE (ROXICODONE) 10 MG tablet, Take 10 mg by mouth Every 8 (Eight) Hours As Needed for Moderate Pain (4-6)., Disp: , Rfl:   •  raNITIdine (ZANTAC) 150 MG tablet, Take 150 mg by mouth Daily., Disp: , Rfl:   •  tiotropium (SPIRIVA HANDIHALER) 18 MCG per inhalation capsule, Place 1 capsule into inhaler and inhale Daily., Disp: , Rfl:   •  tiZANidine (ZANAFLEX) 4 MG tablet, Take 4 mg by mouth At Night As Needed for Muscle Spasms., Disp: , Rfl:   •  topiramate (Topamax) 50 MG tablet, Take 1 tablet by mouth 2  (Two) Times a Day., Disp: 60 tablet, Rfl: 5  •  traZODone (DESYREL) 50 MG tablet, Take 50 mg by mouth Every Night., Disp: , Rfl:     Social History    Tobacco Use      Smoking status: Current Every Day Smoker she estimates smoking 2 cigars/day        Types: Cigars        Start date: 2017      Smokeless tobacco: Never Used       Social History     Substance and Sexual Activity   Alcohol Use Yes   • Alcohol/week: 2.0 standard drinks   • Types: 2 Glasses of wine per week       Caffeine: She has 3 servings of tea per week and 2 servings of coffee per week    Past Medical History:   Diagnosis Date   • Angina at rest (CMS/HCC)    • Asthma    • Back pain    • Diabetes mellitus (CMS/HCC)    • Fibromyalgia    • Hyperlipidemia    • Hypertension    • Mitral valve prolapse    • Vertigo        Past Surgical History:   Procedure Laterality Date   • CARDIAC CATHETERIZATION N/A 2017    Procedure: Left Heart Cath;  Surgeon: Darnell Allen MD;  Location: [facility] INVASIVE LOCATION;  Service:    •  SECTION     • FEMORAL ARTERY REPAIR     • HYSTERECTOMY     • KNEE ARTHROPLASTY, PARTIAL REPLACEMENT     • TOTAL KNEE ARTHROPLASTY Right    She has had wisdom teeth extraction    History reviewed.  Family history is positive for hypertension, sleep apnea, asthma, and cancer.  As well as heart disease and COPD    The following portions of the patient's history were reviewed and updated as appropriate: allergies, current medications, past family history, past medical history, past social history, past surgical history and problem list.    Review of Systems   Constitutional: Positive for fatigue.   HENT: Positive for sneezing and trouble swallowing.    Eyes: Positive for pain and visual disturbance.   Respiratory: Positive for apnea and chest tightness.    Cardiovascular: Positive for palpitations.   Gastrointestinal: Positive for abdominal pain.   Endocrine: Negative.    Genitourinary: Negative.    Musculoskeletal:  Positive for back pain, joint swelling, neck pain and neck stiffness.   Skin: Negative.    Allergic/Immunologic: Positive for environmental allergies.   Neurological: Positive for dizziness, weakness, numbness and headaches.   Hematological: Negative.    Psychiatric/Behavioral: Positive for decreased concentration and sleep disturbance. The patient is nervous/anxious.    [place] score is 13/24    Objective     There were no vitals taken for this visit.     Physical Exam  Patient appears awake and alert.  She does not appear to be in acute respiratory distress.  Her stated weight is 244 pounds.  Her height 5 feet 4 inches.  Her body mass index is 40.  She has Mallampati class III anatomy.    Assessment/Plan   Diagnoses and all orders for this visit:    Snoring  -     Polysomnography 4 or More Parameters; Future    Obstructive sleep apnea, adult  -     Polysomnography 4 or More Parameters; Future    Morbid (severe) obesity due to excess calories (CMS/HCC)    Patient has a history of snoring nonrestorative sleep.  I think she has an excellent story for obstructive sleep apnea.  We will plan to proceed to polysomnogram.  We have discussed possible therapies including CPAP, weight control, oral appliance, and surgery.  We have also discussed the long-term consequences of untreated obstructive sleep apnea.  She is encouraged to lose weight.  She is encouraged avoid alcohol and sedatives close to bedtime.  She is encouraged to practice lateral position sleep.  We will see her back in after study.    Total time: 30 minutes exclusive of procedures.         Irvin Barnhart MD [facility]  Sleep Medicine  Pulmonary and Critical Care Medicine

## 2020-09-07 ENCOUNTER — APPOINTMENT (OUTPATIENT)
Dept: PREADMISSION TESTING | Facility: HOSPITAL | Age: 50
End: 2020-09-07

## 2020-09-07 LAB
REF LAB TEST METHOD: NORMAL
SARS-COV-2 RNA RESP QL NAA+PROBE: NOT DETECTED

## 2020-09-07 PROCEDURE — U0004 COV-19 TEST NON-CDC HGH THRU: HCPCS

## 2020-09-07 PROCEDURE — C9803 HOPD COVID-19 SPEC COLLECT: HCPCS

## 2020-09-09 ENCOUNTER — HOSPITAL ENCOUNTER (OUTPATIENT)
Dept: SLEEP MEDICINE | Facility: HOSPITAL | Age: 50
Discharge: HOME | End: 2020-09-09
Admitting: INTERNAL MEDICINE

## 2020-09-09 VITALS
WEIGHT: 238 LBS | SYSTOLIC BLOOD PRESSURE: 139 MMHG | OXYGEN SATURATION: 96 % | HEIGHT: 64 IN | DIASTOLIC BLOOD PRESSURE: 72 MMHG | HEART RATE: 90 BPM | BODY MASS INDEX: 40.63 KG/M2

## 2020-09-09 DIAGNOSIS — R06.83 SNORING: ICD-10-CM

## 2020-09-09 DIAGNOSIS — G47.33 OBSTRUCTIVE SLEEP APNEA, ADULT: ICD-10-CM

## 2020-09-09 PROCEDURE — 95810 POLYSOM 6/> YRS 4/> PARAM: CPT

## 2020-09-09 PROCEDURE — 95810 POLYSOM 6/> YRS 4/> PARAM: CPT | Performed by: INTERNAL MEDICINE

## 2020-09-15 ENCOUNTER — TELEPHONE (OUTPATIENT)
Dept: SLEEP MEDICINE | Facility: HOSPITAL | Age: 50
End: 2020-09-15

## 2020-09-16 ENCOUNTER — TELEMEDICINE (OUTPATIENT)
Dept: SLEEP MEDICINE | Facility: HOSPITAL | Age: 50
End: 2020-09-16

## 2020-09-16 DIAGNOSIS — R06.83 SNORING: Primary | ICD-10-CM

## 2020-09-16 DIAGNOSIS — G47.33 OBSTRUCTIVE SLEEP APNEA: ICD-10-CM

## 2020-09-16 DIAGNOSIS — E66.01 CLASS 2 SEVERE OBESITY DUE TO EXCESS CALORIES WITH SERIOUS COMORBIDITY AND BODY MASS INDEX (BMI) OF 39.0 TO 39.9 IN ADULT (HCC): ICD-10-CM

## 2020-09-16 PROCEDURE — 99213 OFFICE O/P EST LOW 20 MIN: CPT | Performed by: INTERNAL MEDICINE

## 2020-09-16 NOTE — PROGRESS NOTES
Subjective   Kelsi Costa is a 50 y.o. female is here today for follow-up.  She is seen for follow-up of snoring and nonrestorative sleep.  Her primary care provider is JUAN David.  She is referred by Dr. Martinez.  You have chosen to receive care through a telehealth visit.  Do you consent to use a video/audio connection for your medical care today? Yes    History of Present Illness  Patient was last seen . She has a history of snoring and nonrestorative sleep.  She also has a history of pseudotumor cerebri, hypertension, diabetes, angina, and obesity.  She had a polysomnogram on  and returns for results.  She denies any real changes in her health status.  She says she sleeps very soundly when she takes her medications.  She does say she feels much better on days following use her old machine.  She has lost she says 46 pounds since her previous study.  Past Medical History:   Diagnosis Date   • Angina at rest (CMS/HCC)    • Asthma    • Back pain    • Diabetes mellitus (CMS/HCC)    • Fibromyalgia    • Hyperlipidemia    • Hypertension    • Mitral valve prolapse    • Vertigo        Past Surgical History:   Procedure Laterality Date   • CARDIAC CATHETERIZATION N/A 2017    Procedure: Left Heart Cath;  Surgeon: Darnell Allen MD;  Location: [facility] INVASIVE LOCATION;  Service:    •  SECTION     • FEMORAL ARTERY REPAIR     • HYSTERECTOMY     • KNEE ARTHROPLASTY, PARTIAL REPLACEMENT     • TOTAL KNEE ARTHROPLASTY Right            Current Outpatient Medications:   •  amitriptyline (ELAVIL) 10 MG tablet, Take 10 mg by mouth Every Night., Disp: , Rfl:   •  atorvastatin (LIPITOR) 40 MG tablet, Take 40 mg by mouth Daily., Disp: , Rfl:   •  budesonide (RHINOCORT ALLERGY) 32 MCG/ACT nasal spray, 1 spray into each nostril 2 (Two) Times a Day., Disp: , Rfl:   •  diltiaZEM (CARDIZEM) 60 MG tablet, Take 60 mg by mouth Daily., Disp: , Rfl:   •  estradiol (ESTRACE) 1 MG tablet,  Take 1 mg by mouth Daily., Disp: , Rfl:   •  ezetimibe (ZETIA) 10 MG tablet, Take 10 mg by mouth Daily., Disp: , Rfl:   •  fluticasone (FLONASE) 50 MCG/ACT nasal spray, 2 sprays into each nostril Daily., Disp: , Rfl:   •  Fluticasone Furoate-Vilanterol (BREO ELLIPTA) 200-25 MCG/INH aerosol powder , Inhale 2 puffs Daily., Disp: , Rfl:   •  gabapentin (NEURONTIN) 800 MG tablet, Take 800 mg by mouth 2 (two) times a day., Disp: , Rfl:   •  losartan-hydrochlorothiazide (HYZAAR) 100-25 MG per tablet, Take 1 tablet by mouth Daily., Disp: , Rfl:   •  metFORMIN (GLUCOPHAGE) 500 MG tablet, Take 500 mg by mouth 2 (Two) Times a Day With Meals., Disp: , Rfl:   •  methylPREDNISolone (MEDROL, CHRISSY,) 4 MG tablet, Take as directed on package instructions., Disp: 1 each, Rfl: 0  •  Morphine Sulfate ER 15 MG tablet extended-release, Take 1 tablet by mouth Daily., Disp: , Rfl:   •  nitroglycerin (NITROSTAT) 0.4 MG SL tablet, Place 1 tablet under the tongue Every 5 (Five) Minutes As Needed for Chest Pain. Take no more than 3 doses in 15 minutes., Disp: 90 tablet, Rfl: 0  •  ondansetron (ZOFRAN) 4 MG tablet, Take 4 mg by mouth Every 6 (Six) Hours As Needed for Nausea or Vomiting., Disp: , Rfl:   •  oxyCODONE (ROXICODONE) 10 MG tablet, Take 10 mg by mouth Every 8 (Eight) Hours As Needed for Moderate Pain (4-6)., Disp: , Rfl:   •  raNITIdine (ZANTAC) 150 MG tablet, Take 150 mg by mouth Daily., Disp: , Rfl:   •  tiotropium (SPIRIVA HANDIHALER) 18 MCG per inhalation capsule, Place 1 capsule into inhaler and inhale Daily., Disp: , Rfl:   •  tiZANidine (ZANAFLEX) 4 MG tablet, Take 4 mg by mouth At Night As Needed for Muscle Spasms., Disp: , Rfl:   •  topiramate (Topamax) 50 MG tablet, Take 1 tablet by mouth 2 (Two) Times a Day., Disp: 60 tablet, Rfl: 5  •  traZODone (DESYREL) 50 MG tablet, Take 50 mg by mouth Every Night., Disp: , Rfl:     Allergies   Allergen Reactions   • Levaquin [Levofloxacin] Itching and Rash       The following portions  of the patient's history were reviewed and updated as appropriate: allergies, current medications and problem list.    Review of Systems   Constitutional: Positive for fatigue.   HENT: Positive for sneezing and trouble swallowing.    Eyes: Positive for visual disturbance.   Respiratory: Positive for chest tightness and shortness of breath.    Cardiovascular: Positive for palpitations.   Gastrointestinal: Positive for abdominal pain.   Endocrine: Positive for polydipsia.   Genitourinary: Negative.    Musculoskeletal: Positive for back pain, joint swelling, neck pain and neck stiffness.   Skin: Negative.    Allergic/Immunologic: Positive for environmental allergies.   Neurological: Positive for dizziness, weakness, numbness and headaches.   Hematological: Negative.    Psychiatric/Behavioral: Positive for decreased concentration and sleep disturbance. The patient is nervous/anxious.    Southington score is 12/24    Objective     There were no vitals taken for this visit.    Physical Exam  Patient appears awake and alert.  She does not appear to be in acute respiratory distress.  Her stated weight is 238 pounds.  Her height is 5 feet 4 inches.  Her body mass index is 39.    Polysomnogram shows excellent sleep efficiency but some diminished REM sleep.  Overall AHI was normal at 3.5 but her supine index was 5.    Assessment/Plan   Diagnoses and all orders for this visit:    Snoring  -     Polysomnography 4 or More Parameters; Future    Obstructive sleep apnea  -     Polysomnography 4 or More Parameters; Future    Class 2 severe obesity due to excess calories with serious comorbidity and body mass index (BMI) of 39.0 to 39.9 in adult (CMS/[facility])    The patient insists that she feels much better after using her old CPAP machine.  She is interested in getting new machine.  We will repeat her study and try to get her sleep exclusively in the supine position.  She may well have a positive study doing that.  We will see her again  after her study.    She is encouraged to lose weight.  She is encouraged to avoid alcohol and sedatives close to bedtime.  She is encouraged to practice lateral position sleep.    Total time: 15 minutes exclusive procedures.             Irvin Barnhart MD [facility]  Sleep Medicine  Pulmonary and Critical Care Medicine      09/16/20  10:39 EDT

## 2020-09-25 ENCOUNTER — APPOINTMENT (OUTPATIENT)
Dept: PREADMISSION TESTING | Facility: HOSPITAL | Age: 50
End: 2020-09-25

## 2020-09-27 ENCOUNTER — APPOINTMENT (OUTPATIENT)
Dept: PREADMISSION TESTING | Facility: HOSPITAL | Age: 50
End: 2020-09-27

## 2020-09-27 PROCEDURE — U0004 COV-19 TEST NON-CDC HGH THRU: HCPCS | Performed by: INTERNAL MEDICINE

## 2020-09-27 PROCEDURE — C9803 HOPD COVID-19 SPEC COLLECT: HCPCS

## 2020-09-28 LAB — SARS-COV-2 RNA NOSE QL NAA+PROBE: NOT DETECTED

## 2020-09-29 ENCOUNTER — HOSPITAL ENCOUNTER (OUTPATIENT)
Dept: SLEEP MEDICINE | Facility: HOSPITAL | Age: 50
Discharge: HOME | End: 2020-09-29
Admitting: INTERNAL MEDICINE

## 2020-09-29 VITALS
HEART RATE: 88 BPM | DIASTOLIC BLOOD PRESSURE: 67 MMHG | OXYGEN SATURATION: 98 % | BODY MASS INDEX: 40.61 KG/M2 | SYSTOLIC BLOOD PRESSURE: 144 MMHG | HEIGHT: 64 IN | WEIGHT: 237.88 LBS

## 2020-09-29 DIAGNOSIS — R06.83 SNORING: ICD-10-CM

## 2020-09-29 DIAGNOSIS — G47.33 OBSTRUCTIVE SLEEP APNEA: ICD-10-CM

## 2020-09-29 PROCEDURE — 95810 POLYSOM 6/> YRS 4/> PARAM: CPT

## 2020-09-29 PROCEDURE — 95810 POLYSOM 6/> YRS 4/> PARAM: CPT | Performed by: INTERNAL MEDICINE

## 2020-10-01 DIAGNOSIS — G47.33 OBSTRUCTIVE SLEEP APNEA: Primary | ICD-10-CM

## 2020-10-14 ENCOUNTER — OFFICE VISIT (OUTPATIENT)
Dept: NEUROLOGY | Facility: CLINIC | Age: 50
End: 2020-10-14

## 2020-10-14 VITALS
TEMPERATURE: 97.3 F | DIASTOLIC BLOOD PRESSURE: 64 MMHG | BODY MASS INDEX: 40.63 KG/M2 | HEIGHT: 64 IN | SYSTOLIC BLOOD PRESSURE: 108 MMHG | WEIGHT: 238 LBS | HEART RATE: 73 BPM | OXYGEN SATURATION: 98 %

## 2020-10-14 DIAGNOSIS — G93.2 IIH (IDIOPATHIC INTRACRANIAL HYPERTENSION): ICD-10-CM

## 2020-10-14 DIAGNOSIS — G44.209 TENSION HEADACHE: Primary | ICD-10-CM

## 2020-10-14 DIAGNOSIS — G47.33 OBSTRUCTIVE SLEEP APNEA: ICD-10-CM

## 2020-10-14 PROCEDURE — 99214 OFFICE O/P EST MOD 30 MIN: CPT | Performed by: PSYCHIATRY & NEUROLOGY

## 2020-10-14 RX ORDER — RANOLAZINE 500 MG/1
TABLET, EXTENDED RELEASE ORAL
COMMUNITY
Start: 2020-10-12

## 2020-10-14 RX ORDER — ONDANSETRON 4 MG/1
TABLET, ORALLY DISINTEGRATING ORAL
COMMUNITY
Start: 2020-10-08

## 2020-10-14 RX ORDER — TOPIRAMATE 50 MG/1
TABLET, FILM COATED ORAL
Qty: 75 TABLET | Refills: 5 | Status: SHIPPED | OUTPATIENT
Start: 2020-10-14 | End: 2022-11-14

## 2020-10-14 RX ORDER — ATORVASTATIN CALCIUM 80 MG/1
TABLET, FILM COATED ORAL
COMMUNITY
Start: 2020-09-03

## 2020-10-14 NOTE — PROGRESS NOTES
Subjective:    CC: Kelsi Costa is seen today for Tension headache       HPI:  Current visit-since the last visit patient states that her headaches have improved a lot since increasing her Topamax to 50 mg twice a day.  However she continues to have about 2 headaches each week for which she lays down with a cool compress over her head and a cloth over her eyes.  She has stopped taking Excedrin now and also finished her Medrol Dosepak.  She had her sleep study that showed mild MARY.  Started using her CPAP yesterday.  Also had her MRI brain that I personally reviewed that shows an empty sella but no other acute intracranial abnormalities.  She has an appointment with Dr. Gan at  in December.    Initial [id number]old female with a past medical history of arthritis, chronic neck and low back pain, anxiety, hypertension, hyperlipidemia, diabetes mellitus type 2 presents with headaches.  As per patient she started having headaches in the past however a few months ago they started to get worse.  She has also been having dizziness and balance problems.  Currently has a headache about 3-4 times a week which is either on the right side or all over, dull in nature with photophobia but no nausea or vomiting.  She typically takes an Excedrin for the pain.  Also has blurred vision in the mornings as well as ringing in her ears even when she does not have a headache.  Was diagnosed with pseudotumor cerebri several years ago and underwent her last lumbar puncture about 6 years ago.  However she states that excessive spinal fluid was taken out at that time which caused her to have side effects.  Was started on Topamax and is currently taking only 50 mg day as well as amitriptyline 10 mg nightly.  Also snores at night with excessive fatigue and daytime somnolence.  Is on morphine and oxycodone for her low back pain in addition to muscle relaxants and gabapentin for her neuropathy.  Last A1c was 6.8.  Has a MRI brain  pending.    The following portions of the patient's history were reviewed today and updated as of 08/11/2020  : allergies, current medications, past family history, past medical history, past social history, past surgical history and problem list  These document will be scanned to patient's chart.      Current Outpatient Medications:   •  amitriptyline (ELAVIL) 10 MG tablet, Take 10 mg by mouth Every Night., Disp: , Rfl:   •  atorvastatin (LIPITOR) 80 MG tablet, TK 1 T PO HS, Disp: , Rfl:   •  budesonide (RHINOCORT ALLERGY) 32 MCG/ACT nasal spray, 1 spray into each nostril 2 (Two) Times a Day., Disp: , Rfl:   •  diltiaZEM (CARDIZEM) 60 MG tablet, Take 60 mg by mouth Daily., Disp: , Rfl:   •  estradiol (ESTRACE) 1 MG tablet, Take 1 mg by mouth Daily., Disp: , Rfl:   •  ezetimibe (ZETIA) 10 MG tablet, Take 10 mg by mouth Daily., Disp: , Rfl:   •  fluticasone (FLONASE) 50 MCG/ACT nasal spray, 2 sprays into each nostril Daily., Disp: , Rfl:   •  Fluticasone Furoate-Vilanterol (BREO ELLIPTA) 200-25 MCG/INH aerosol powder , Inhale 2 puffs Daily., Disp: , Rfl:   •  gabapentin (NEURONTIN) 800 MG tablet, Take 800 mg by mouth 2 (two) times a day., Disp: , Rfl:   •  losartan-hydrochlorothiazide (HYZAAR) 100-25 MG per tablet, Take 1 tablet by mouth Daily., Disp: , Rfl:   •  metFORMIN (GLUCOPHAGE) 1000 MG tablet, Take 1,000 mg by mouth 2 (Two) Times a Day With Meals., Disp: , Rfl:   •  Morphine Sulfate ER 15 MG tablet extended-release, Take 1 tablet by mouth Daily., Disp: , Rfl:   •  nitroglycerin (NITROSTAT) 0.4 MG SL tablet, Place 1 tablet under the tongue Every 5 (Five) Minutes As Needed for Chest Pain. Take no more than 3 doses in 15 minutes., Disp: 90 tablet, Rfl: 0  •  ondansetron ODT (ZOFRAN-ODT) 4 MG disintegrating tablet, , Disp: , Rfl:   •  oxyCODONE (ROXICODONE) 10 MG tablet, Take 10 mg by mouth Every 8 (Eight) Hours As Needed for Moderate Pain (4-6)., Disp: , Rfl:   •  ranolazine (RANEXA) 500 MG 12 hr tablet, , Disp:  ", Rfl:   •  tiotropium (SPIRIVA HANDIHALER) 18 MCG per inhalation capsule, Place 1 capsule into inhaler and inhale Daily., Disp: , Rfl:   •  tiZANidine (ZANAFLEX) 4 MG tablet, Take 4 mg by mouth At Night As Needed for Muscle Spasms., Disp: , Rfl:   •  topiramate (Topamax) 50 MG tablet, Take 1 tablet in the morning and 1.5 tablets at night, Disp: 75 tablet, Rfl: 5  •  traZODone (DESYREL) 50 MG tablet, Take 50 mg by mouth Every Night., Disp: , Rfl:    Past Medical History:   Diagnosis Date   • Angina at rest (CMS/HCC)    • Asthma    • Back pain    • Diabetes mellitus (CMS/HCC)    • Fibromyalgia    • Hyperlipidemia    • Hypertension    • Mitral valve prolapse    • Vertigo       Past Surgical History:   Procedure Laterality Date   • CARDIAC CATHETERIZATION N/A 2017    Procedure: Left Heart Cath;  Surgeon: Darnell Allen MD;  Location: [facility] INVASIVE LOCATION;  Service:    •  SECTION     • FEMORAL ARTERY REPAIR     • HYSTERECTOMY     • KNEE ARTHROPLASTY, PARTIAL REPLACEMENT     • TOTAL KNEE ARTHROPLASTY Right       History reviewed. No pertinent family history.   Social History     Socioeconomic History   • Marital status:      Spouse name: Not on file   • Number of children: Not on file   • Years of education: Not on file   • Highest education level: Not on file   Tobacco Use   • Smoking status: Current Every Day Smoker     Types: Cigars     Start date: 2017   • Smokeless tobacco: Never Used   Substance and Sexual Activity   • Alcohol use: Yes     Alcohol/week: 2.0 standard drinks     Types: 2 Glasses of wine per week   • Drug use: No   • Sexual activity: Defer     Review of Systems   Neurological: Positive for headache.   All other systems reviewed and are negative.      Objective:    /64   Pulse 73   Temp 97.3 °F (36.3 °C)   Ht 162.6 cm (64.02\")   Wt 108 kg (238 lb)   SpO2 98%   BMI 40.83 kg/m²     Neurology Exam:    General apperance: Obese    Mental status: Alert, " awake and oriented to time place and person.    Recent and Remote memory: Intact.    Attention span and Concentration: Normal.     Language and Speech: Intact- No dysarthria.    Fluency, Naming , Repitition and Comprehension:  Intact    Cranial Nerves:   CN II: Visual fields are full. Intact. Fundi - Normal, No papillederma, Pupils - HUMERA  CN III, IV and VI: Extraocular movements are intact. Normal saccades.   CN V: Facial sensation is intact.   CN VII: Muscles of facial expression reveal no asymmetry. Intact.   CN VIII: Hearing is intact. Whispered voice intact.   CN IX and X: Palate elevates symmetrically. Intact  CN XI: Shoulder shrug is intact.   CN XII: Tongue is midline without evidence of atrophy or fasciculation.     Ophthalmoscopic exam of optic disc-normal    Motor:  Right UE muscle strength 5/5. Normal tone.     Left UE muscle strength 5/5. Normal tone.      Right LE muscle strength5/5. Normal tone.     Left LE muscle strength 5/5. Normal tone.      Sensory: Normal light touch, vibration and pinprick sensation bilaterally.    DTRs: 2+ bilaterally in upper and lower extremities.    Babinski: Negative bilaterally.    Co-ordination: Normal finger-to-nose, heel to shin B/L.    Rhomberg: Negative.    Gait: Normal.    Cardiovascular: Regular rate and rhythm without murmur, gallop or rub.    Assessment and Plan:  1. Tension headache  I feel patient could have a combination of tension headaches and headaches due to her IIH  I have also asked her to increase her night dose of Topamax to 75 mg.  She can continue with Topamax 50 mg in the morning.  I do not want to increase her dose significantly as she is already on a lot of sedating medications including morphine, oxycodone and gabapentin    2. IIH (idiopathic intracranial hypertension)  MRI brain showed an empty sella.  Her appointment is scheduled for December.  I have told her to call the office to put her on the wait list and pre-pone it if possible    3.  Obstructive sleep apnea  Has mild MARY.  Is wearing a CPAP now    Of note-I reviewed Dr. Gan's notes.  Patient had normal funduscopic examination bilaterally with no evidence of papilledema.  Retinal nerve fiber layer analysis was robust in both eyes with no abnormalities on ganglion cell analysis or macular OCT.  However due to her previous history of IIH and MRI findings Dr. Gan has suggested that patient get a MR venogram with contrast to assess venous sinuses and a lumbar puncture to assess opening and closing pressure (after holding Topamax for about 1 week).  If pressure remains elevated then she could either up the dose of Topamax or consider trial of Diamox.  Also encouraged to quit smoking and continue with weight loss.  I will order MR venogram for the patient and lumbar puncture    Return in about 3 months (around 1/14/2021).         Carol Martinez MD

## 2020-11-16 ENCOUNTER — OFFICE VISIT (OUTPATIENT)
Dept: SLEEP MEDICINE | Facility: HOSPITAL | Age: 50
End: 2020-11-16

## 2020-11-16 VITALS
SYSTOLIC BLOOD PRESSURE: 99 MMHG | BODY MASS INDEX: 40.67 KG/M2 | DIASTOLIC BLOOD PRESSURE: 57 MMHG | HEIGHT: 64 IN | HEART RATE: 64 BPM | WEIGHT: 238.2 LBS

## 2020-11-16 DIAGNOSIS — G47.33 OSA (OBSTRUCTIVE SLEEP APNEA): Primary | ICD-10-CM

## 2020-11-16 PROCEDURE — 99212 OFFICE O/P EST SF 10 MIN: CPT | Performed by: NURSE PRACTITIONER

## 2020-11-16 NOTE — PROGRESS NOTES
Chief Complaint:   Chief Complaint   Patient presents with   • Follow-up       HPI:    Kelsi Costa is a 50 y.o. female here for follow-up of sleep apnea.  Patient was last seen 9/4/2020 for snoring, witnessed apneas, morning headaches for mornings out of 7, nonrestorative sleep.  Patient has a past history of CPAP but did only use x1 year.  Patient did restudy with us 9/29/2020 that showed mild obstructive sleep apnea and did decide to initiate CPAP therapy.  When patient sleeps with CPAP she will sleep 7 hours nightly and go to sleep immediately and not get up during the night.  Patient states, however, that she usually will go to sleep at night while watching TV before putting her mask on.  I discussed putting her mask on while watching her show and then if she goes to sleep she will be within compliance.  Patient states she does wish to be compliant and will try harder to do so.        Current medications are:   Current Outpatient Medications:   •  amitriptyline (ELAVIL) 10 MG tablet, Take 10 mg by mouth Every Night., Disp: , Rfl:   •  atorvastatin (LIPITOR) 80 MG tablet, TK 1 T PO HS, Disp: , Rfl:   •  budesonide (RHINOCORT ALLERGY) 32 MCG/ACT nasal spray, 1 spray into each nostril 2 (Two) Times a Day., Disp: , Rfl:   •  diltiaZEM (CARDIZEM) 60 MG tablet, Take 60 mg by mouth Daily., Disp: , Rfl:   •  estradiol (ESTRACE) 1 MG tablet, Take 1 mg by mouth Daily., Disp: , Rfl:   •  ezetimibe (ZETIA) 10 MG tablet, Take 10 mg by mouth Daily., Disp: , Rfl:   •  fluticasone (FLONASE) 50 MCG/ACT nasal spray, 2 sprays into each nostril Daily., Disp: , Rfl:   •  Fluticasone Furoate-Vilanterol (BREO ELLIPTA) 200-25 MCG/INH aerosol powder , Inhale 2 puffs Daily., Disp: , Rfl:   •  gabapentin (NEURONTIN) 800 MG tablet, Take 800 mg by mouth 2 (two) times a day., Disp: , Rfl:   •  losartan-hydrochlorothiazide (HYZAAR) 100-25 MG per tablet, Take 1 tablet by mouth Daily., Disp: , Rfl:   •  metFORMIN (GLUCOPHAGE) 1000 MG  tablet, Take 1,000 mg by mouth 2 (Two) Times a Day With Meals., Disp: , Rfl:   •  Morphine Sulfate ER 15 MG tablet extended-release, Take 1 tablet by mouth Daily., Disp: , Rfl:   •  nitroglycerin (NITROSTAT) 0.4 MG SL tablet, Place 1 tablet under the tongue Every 5 (Five) Minutes As Needed for Chest Pain. Take no more than 3 doses in 15 minutes., Disp: 90 tablet, Rfl: 0  •  ondansetron ODT (ZOFRAN-ODT) 4 MG disintegrating tablet, , Disp: , Rfl:   •  oxyCODONE (ROXICODONE) 10 MG tablet, Take 10 mg by mouth Every 8 (Eight) Hours As Needed for Moderate Pain (4-6)., Disp: , Rfl:   •  ranolazine (RANEXA) 500 MG 12 hr tablet, , Disp: , Rfl:   •  tiotropium (SPIRIVA HANDIHALER) 18 MCG per inhalation capsule, Place 1 capsule into inhaler and inhale Daily., Disp: , Rfl:   •  tiZANidine (ZANAFLEX) 4 MG tablet, Take 4 mg by mouth At Night As Needed for Muscle Spasms., Disp: , Rfl:   •  topiramate (Topamax) 50 MG tablet, Take 1 tablet in the morning and 1.5 tablets at night, Disp: 75 tablet, Rfl: 5  •  traZODone (DESYREL) 50 MG tablet, Take 50 mg by mouth Every Night., Disp: , Rfl: .      The patient's relevant past medical, surgical, family and social history were reviewed and updated in Epic as appropriate.       Review of Systems   HENT: Positive for trouble swallowing.    Eyes: Positive for pain and visual disturbance.   Respiratory: Positive for apnea and chest tightness.    Cardiovascular: Positive for palpitations.   Gastrointestinal: Positive for abdominal pain.   Musculoskeletal: Positive for arthralgias, back pain, joint swelling, neck pain and neck stiffness.   Allergic/Immunologic: Positive for environmental allergies.   Neurological: Positive for dizziness, weakness, numbness and headaches.   Psychiatric/Behavioral: Positive for decreased concentration and sleep disturbance. The patient is nervous/anxious.    All other systems reviewed and are negative.        Objective:    Physical Exam  Constitutional:        Appearance: Normal appearance. She is obese.   HENT:      Head: Normocephalic and atraumatic.      Mouth/Throat:      Mouth: Mucous membranes are moist.      Comments: Class 3 airway  Pulmonary:      Effort: Pulmonary effort is normal.      Breath sounds: Normal breath sounds.   Skin:     General: Skin is warm and dry.      Coloration: Skin is not jaundiced or pale.   Neurological:      Mental Status: She is alert.   Psychiatric:         Mood and Affect: Mood normal.         Behavior: Behavior normal.         Thought Content: Thought content normal.         Judgment: Judgment normal.     21/32 days of use  Greater than 4-hour use 43.8%  90% pressure 9.8  AHI 1.0  Settings 8-18.      ASSESSMENT/PLAN    Diagnoses and all orders for this visit:    1. MARY (obstructive sleep apnea) (Primary)  -     CPAP Therapy            1. Counseled patient regarding multimodal approach with healthy nutrition, healthy sleep, regular physical activity, social activities, counseling, and medications. Encouraged to practice lateral sleep position. Avoid alcohol and sedatives close to bedtime.  2.   I will see patient back in 4 weeks and if she remains noncompliant at that time I have stressed she will probably lose her machine due to insurance qualifications.  Patient does wish to be compliant and will try again.  I have reviewed the results of my evaluation and impression and discussed my recommendations in detail with the patient.      Signed by  JUAN Richard    November 16, 2020      CC: Celeste Sadler APRN White, John R, MD

## 2020-12-03 ENCOUNTER — TELEPHONE (OUTPATIENT)
Dept: SLEEP MEDICINE | Facility: HOSPITAL | Age: 50
End: 2020-12-03

## 2020-12-30 ENCOUNTER — TELEPHONE (OUTPATIENT)
Dept: NEUROLOGY | Facility: CLINIC | Age: 50
End: 2020-12-30

## 2020-12-30 NOTE — TELEPHONE ENCOUNTER
M for patient informing her that Dr. Martinez has review Dr. Gan's notes from [facility] and informed her Dr. Martinez has order diagnostic imaging for her.

## 2020-12-30 NOTE — TELEPHONE ENCOUNTER
----- Message from Carol Martinez MD sent at 12/28/2020  4:23 PM EST -----    Please let the patient know that I reviewed Dr. Gan's note from .  She has suggested that the patient get a MR venogram study of the brain and lumbar puncture to look at her spinal fluid pressures.  I have ordered both of these tests for her.  10 days before her lumbar puncture patient should stop her morning dose of Topamax and just continue the night dose for 3 days.  1 week before the lumbar puncture she should stop taking the Topamax altogether.  Let me know if she has any questions

## 2021-01-08 ENCOUNTER — TELEMEDICINE (OUTPATIENT)
Dept: NEUROLOGY | Facility: CLINIC | Age: 51
End: 2021-01-08

## 2021-01-08 DIAGNOSIS — G47.33 OBSTRUCTIVE SLEEP APNEA: ICD-10-CM

## 2021-01-08 DIAGNOSIS — G93.2 IIH (IDIOPATHIC INTRACRANIAL HYPERTENSION): Primary | ICD-10-CM

## 2021-01-08 PROCEDURE — 99213 OFFICE O/P EST LOW 20 MIN: CPT | Performed by: PSYCHIATRY & NEUROLOGY

## 2021-01-08 NOTE — PROGRESS NOTES
Subjective:    CC: Kelsi Costa is seen today via video/telephone visit for headaches    HPI:  Current visit-patient states that her headaches are very well controlled since she increased her dose of Topamax to 75 mg at night.  Continues to take 50 mg in the morning.  She saw Dr. Gan who is notes I reviewed.  Patient had normal funduscopic examination bilaterally with no evidence of papilledema.  Retinal nerve fiber layer analysis was robust in both eyes with no abnormalities on ganglion cell analysis or macular OCT.  However due to her previous history of IIH and MRI findings Dr. Gan has suggested that patient get a MR venogram with contrast to assess venous sinuses and a lumbar puncture to assess opening and closing pressure (after holding Topamax for about 1 week).  If pressure remains elevated then she could either up the dose of Topamax or consider trial of Diamox.  Also encouraged to quit smoking and continue with weight loss.  I have ordered patient's MR venogram and lumbar puncture which is scheduled for later this month.    Last visit-since the last visit patient states that her headaches have improved a lot since increasing her Topamax to 50 mg twice a day.  However she continues to have about 2 headaches each week for which she lays down with a cool compress over her head and a cloth over her eyes.  She has stopped taking Excedrin now and also finished her Medrol Dosepak.  She had her sleep study that showed mild MARY.  Started using her CPAP yesterday.  Also had her MRI brain that I personally reviewed that shows an empty sella but no other acute intracranial abnormalities.  She has an appointment with Dr. Gan at  in December.    Initial [id number] female with a past medical history of arthritis, chronic neck and low back pain, anxiety, hypertension, hyperlipidemia, diabetes mellitus type 2 presents with headaches.  As per patient she started having headaches in the past  however a few months ago they started to get worse.  She has also been having dizziness and balance problems.  Currently has a headache about 3-4 times a week which is either on the right side or all over, dull in nature with photophobia but no nausea or vomiting.  She typically takes an Excedrin for the pain.  Also has blurred vision in the mornings as well as ringing in her ears even when she does not have a headache.  Was diagnosed with pseudotumor cerebri several years ago and underwent her last lumbar puncture about 6 years ago.  However she states that excessive spinal fluid was taken out at that time which caused her to have side effects.  Was started on Topamax and is currently taking only 50 mg day as well as amitriptyline 10 mg nightly.  Also snores at night with excessive fatigue and daytime somnolence.  Is on morphine and oxycodone for her low back pain in addition to muscle relaxants and gabapentin for her neuropathy.  Last A1c was 6.8.  Has a MRI brain pending.    The following portions of the patient's history were reviewed today and updated as of 08/11/2020  : allergies, current medications, past family history, past medical history, past social history, past surgical history and problem list  These document will be scanned to patient's chart.      Current Outpatient Medications:   •  amitriptyline (ELAVIL) 10 MG tablet, Take 10 mg by mouth Every Night., Disp: , Rfl:   •  atorvastatin (LIPITOR) 80 MG tablet, TK 1 T PO HS, Disp: , Rfl:   •  budesonide (RHINOCORT ALLERGY) 32 MCG/ACT nasal spray, 1 spray into each nostril 2 (Two) Times a Day., Disp: , Rfl:   •  diltiaZEM (CARDIZEM) 60 MG tablet, Take 60 mg by mouth Daily., Disp: , Rfl:   •  estradiol (ESTRACE) 1 MG tablet, Take 1 mg by mouth Daily., Disp: , Rfl:   •  ezetimibe (ZETIA) 10 MG tablet, Take 10 mg by mouth Daily., Disp: , Rfl:   •  fluticasone (FLONASE) 50 MCG/ACT nasal spray, 2 sprays into each nostril Daily., Disp: , Rfl:   •  Fluticasone  Furoate-Vilanterol (BREO ELLIPTA) 200-25 MCG/INH aerosol powder , Inhale 2 puffs Daily., Disp: , Rfl:   •  gabapentin (NEURONTIN) 800 MG tablet, Take 800 mg by mouth 2 (two) times a day., Disp: , Rfl:   •  losartan-hydrochlorothiazide (HYZAAR) 100-25 MG per tablet, Take 1 tablet by mouth Daily., Disp: , Rfl:   •  metFORMIN (GLUCOPHAGE) 1000 MG tablet, Take 1,000 mg by mouth 2 (Two) Times a Day With Meals., Disp: , Rfl:   •  Morphine Sulfate ER 15 MG tablet extended-release, Take 1 tablet by mouth Daily., Disp: , Rfl:   •  nitroglycerin (NITROSTAT) 0.4 MG SL tablet, Place 1 tablet under the tongue Every 5 (Five) Minutes As Needed for Chest Pain. Take no more than 3 doses in 15 minutes., Disp: 90 tablet, Rfl: 0  •  ondansetron ODT (ZOFRAN-ODT) 4 MG disintegrating tablet, , Disp: , Rfl:   •  oxyCODONE (ROXICODONE) 10 MG tablet, Take 10 mg by mouth Every 8 (Eight) Hours As Needed for Moderate Pain (4-6)., Disp: , Rfl:   •  ranolazine (RANEXA) 500 MG 12 hr tablet, , Disp: , Rfl:   •  tiotropium (SPIRIVA HANDIHALER) 18 MCG per inhalation capsule, Place 1 capsule into inhaler and inhale Daily., Disp: , Rfl:   •  tiZANidine (ZANAFLEX) 4 MG tablet, Take 4 mg by mouth At Night As Needed for Muscle Spasms., Disp: , Rfl:   •  topiramate (Topamax) 50 MG tablet, Take 1 tablet in the morning and 1.5 tablets at night, Disp: 75 tablet, Rfl: 5  •  traZODone (DESYREL) 50 MG tablet, Take 50 mg by mouth Every Night., Disp: , Rfl:    Past Medical History:   Diagnosis Date   • Angina at rest (CMS/HCC)    • Asthma    • Back pain    • Diabetes mellitus (CMS/HCC)    • Fibromyalgia    • Hyperlipidemia    • Hypertension    • Mitral valve prolapse    • Vertigo       Past Surgical History:   Procedure Laterality Date   • CARDIAC CATHETERIZATION N/A 2017    Procedure: Left Heart Cath;  Surgeon: Darnell Allen MD;  Location: [facility];  Service:    •  SECTION     • FEMORAL ARTERY REPAIR     • HYSTERECTOMY      • KNEE ARTHROPLASTY, PARTIAL REPLACEMENT     • TOTAL KNEE ARTHROPLASTY Right       No family history on file.   Social History     Socioeconomic History   • Marital status:      Spouse name: Not on file   • Number of children: Not on file   • Years of education: Not on file   • Highest education level: Not on file   Tobacco Use   • Smoking status: Current Every Day Smoker     Types: Cigars     Start date: 6/16/2017   • Smokeless tobacco: Never Used   Substance and Sexual Activity   • Alcohol use: Yes     Alcohol/week: 2.0 standard drinks     Types: 2 Glasses of wine per week   • Drug use: No   • Sexual activity: Defer     Review of Systems   Neurological: Positive for headache.   All other systems reviewed and are negative.      Objective:    There were no vitals taken for this visit.    Neurology Exam:    Speech/mentation intact.  Rest of the examination not performed      Assessment and Plan:  1. IIH  I have asked her to continue Topamax 50 mg in the morning and 75 mg at night  I do not want to increase her dose significantly as she is already on a lot of sedating medications including morphine, oxycodone and gabapentin  -MR venogram and lumbar puncture pending    2. Obstructive sleep apnea  Has mild MARY.  Is wearing a CPAP now        Return in about 3 months (around 4/8/2021).     This visit has been rescheduled as a phone visit to comply with patient safety concerns in accordance with CDC recommendations. Total time of discussion/reviewing her records was 15 minutes.      Carol Martinez MD

## 2021-01-15 ENCOUNTER — HOSPITAL ENCOUNTER (OUTPATIENT)
Dept: MRI IMAGING | Facility: HOSPITAL | Age: 51
Discharge: HOME | End: 2021-01-15

## 2021-01-15 ENCOUNTER — HOSPITAL ENCOUNTER (OUTPATIENT)
Dept: GENERAL RADIOLOGY | Facility: HOSPITAL | Age: 51
Discharge: HOME | End: 2021-01-15

## 2021-01-15 ENCOUNTER — TELEPHONE (OUTPATIENT)
Dept: NEUROLOGY | Facility: CLINIC | Age: 51
End: 2021-01-15

## 2021-01-15 VITALS
BODY MASS INDEX: 41.83 KG/M2 | TEMPERATURE: 97.8 F | OXYGEN SATURATION: 97 % | WEIGHT: 245 LBS | HEIGHT: 64 IN | RESPIRATION RATE: 16 BRPM | HEART RATE: 66 BPM | SYSTOLIC BLOOD PRESSURE: 107 MMHG | DIASTOLIC BLOOD PRESSURE: 58 MMHG

## 2021-01-15 DIAGNOSIS — G93.2 IIH (IDIOPATHIC INTRACRANIAL HYPERTENSION): ICD-10-CM

## 2021-01-15 LAB
APPEARANCE CSF: CLEAR
COLOR CSF: COLORLESS
COLOR SPUN CSF: COLORLESS
GLUCOSE BLDC GLUCOMTR-MCNC: 148 MG/DL (ref 70–130)
GLUCOSE CSF-MCNC: 87 MG/DL (ref 40–70)
PROT CSF-MCNC: 32 MG/DL (ref 15–45)
RBC # CSF MANUAL: 12 /MM3 (ref 0–5)
SPECIMEN VOL CSF: 9 ML
TUBE # CSF: 1
WBC # CSF MANUAL: 1 /MM3 (ref 0–5)

## 2021-01-15 PROCEDURE — 0 GADOBENATE DIMEGLUMINE 529 MG/ML SOLUTION: Performed by: PSYCHIATRY & NEUROLOGY

## 2021-01-15 PROCEDURE — 82945 GLUCOSE OTHER FLUID: CPT | Performed by: PSYCHIATRY & NEUROLOGY

## 2021-01-15 PROCEDURE — 87205 SMEAR GRAM STAIN: CPT | Performed by: PSYCHIATRY & NEUROLOGY

## 2021-01-15 PROCEDURE — 89050 BODY FLUID CELL COUNT: CPT | Performed by: PSYCHIATRY & NEUROLOGY

## 2021-01-15 PROCEDURE — 87070 CULTURE OTHR SPECIMN AEROBIC: CPT | Performed by: PSYCHIATRY & NEUROLOGY

## 2021-01-15 PROCEDURE — 82565 ASSAY OF CREATININE: CPT

## 2021-01-15 PROCEDURE — 87015 SPECIMEN INFECT AGNT CONCNTJ: CPT | Performed by: PSYCHIATRY & NEUROLOGY

## 2021-01-15 PROCEDURE — 70545 MR ANGIOGRAPHY HEAD W/DYE: CPT

## 2021-01-15 PROCEDURE — 82962 GLUCOSE BLOOD TEST: CPT

## 2021-01-15 PROCEDURE — A9577 INJ MULTIHANCE: HCPCS | Performed by: PSYCHIATRY & NEUROLOGY

## 2021-01-15 PROCEDURE — 84157 ASSAY OF PROTEIN OTHER: CPT | Performed by: PSYCHIATRY & NEUROLOGY

## 2021-01-15 RX ORDER — LIDOCAINE HYDROCHLORIDE 10 MG/ML
5 INJECTION, SOLUTION EPIDURAL; INFILTRATION; INTRACAUDAL; PERINEURAL ONCE
Status: COMPLETED | OUTPATIENT
Start: 2021-01-15 | End: 2021-01-15

## 2021-01-15 RX ADMIN — LIDOCAINE HYDROCHLORIDE 5 ML: 10 INJECTION, SOLUTION EPIDURAL; INFILTRATION; INTRACAUDAL; PERINEURAL at 11:50

## 2021-01-15 RX ADMIN — GADOBENATE DIMEGLUMINE 20 ML: 529 INJECTION, SOLUTION INTRAVENOUS at 09:49

## 2021-01-15 NOTE — TELEPHONE ENCOUNTER
----- Message from Carol Martinez MD sent at 1/15/2021  3:44 PM EST -----  Can you tell the patient that both her MR venogram of the brain to look at her sinuses and her lumbar puncture were normal.  She can continue the same dose of Topamax

## 2021-01-15 NOTE — POST-PROCEDURE NOTE
Radiology Procedure    Pre-procedure: procedure, risks discussed with patient. Patient indicated understanding and consented to procedure.   Procedure Performed: lumbar puncture     IV Sedation and/or Anesthesia:  No    Complications: none    Preliminary Findings: opening pressure 20.5cm H2O, closing pressure 15cm H2O    Specimen Removed: 8cc clear, colorless CSF    Estimated Blood Loss:  0ml    Post-Procedure Diagnosis: pending    Post-Procedure Plan: encourage fluids, bed rest x 2 hours    Standard Discharge Instructions Given:yes     MELISSA Pacheco  01/15/21  11:38 EST

## 2021-01-18 ENCOUNTER — TELEPHONE (OUTPATIENT)
Dept: INFUSION THERAPY | Facility: HOSPITAL | Age: 51
End: 2021-01-18

## 2021-01-18 LAB
BACTERIA SPEC AEROBE CULT: NORMAL
GRAM STN SPEC: NORMAL

## 2021-01-19 ENCOUNTER — TELEMEDICINE (OUTPATIENT)
Dept: SLEEP MEDICINE | Facility: HOSPITAL | Age: 51
End: 2021-01-19

## 2021-01-19 VITALS — BODY MASS INDEX: 40.63 KG/M2 | HEIGHT: 64 IN | WEIGHT: 238 LBS

## 2021-01-19 DIAGNOSIS — G47.33 OSA (OBSTRUCTIVE SLEEP APNEA): Primary | ICD-10-CM

## 2021-01-19 PROCEDURE — 99212 OFFICE O/P EST SF 10 MIN: CPT | Performed by: NURSE PRACTITIONER

## 2021-01-19 NOTE — PROGRESS NOTES
Chief Complaint:   Chief Complaint   Patient presents with   • Follow-up       HPI:    Kelsi Costa is a 50 y.o. female here for follow-up of sleep apnea.  Patient was last seen 11/16/2020 and was not compliant.  Patient presents today to reassess compliance.  Patient is not in compliance at this time, however, patient's son with Down syndrome has been in the ICU with Covid and on dialysis.  Patient son was only discharged home on [address] [facility].  Her son has an erratic sleep pattern at this time awakening frequently and also having difficulty going to sleep so patient states she is up and down and not getting a normal sleep pattern.  She does have an Cunningham score of 10/24.  Patient did call her DME and they are going to give her a 2-week trial.  From today and if she is compliant at that time due to her circumstances they will let her keep her machine otherwise she will turn this back in.  Patient feels strongly that she will be able to be in compliance.        Current medications are:   Current Outpatient Medications:   •  amitriptyline (ELAVIL) 10 MG tablet, Take 10 mg by mouth Every Night., Disp: , Rfl:   •  atorvastatin (LIPITOR) 80 MG tablet, TK 1 T PO HS, Disp: , Rfl:   •  budesonide (RHINOCORT ALLERGY) 32 MCG/ACT nasal spray, 1 spray into each nostril 2 (Two) Times a Day., Disp: , Rfl:   •  diltiaZEM (CARDIZEM) 60 MG tablet, Take 60 mg by mouth Daily., Disp: , Rfl:   •  estradiol (ESTRACE) 1 MG tablet, Take 1 mg by mouth Daily., Disp: , Rfl:   •  ezetimibe (ZETIA) 10 MG tablet, Take 10 mg by mouth Daily., Disp: , Rfl:   •  fluticasone (FLONASE) 50 MCG/ACT nasal spray, 2 sprays into each nostril Daily., Disp: , Rfl:   •  gabapentin (NEURONTIN) 800 MG tablet, Take 800 mg by mouth 2 (two) times a day., Disp: , Rfl:   •  losartan-hydrochlorothiazide (HYZAAR) 100-25 MG per tablet, Take 1 tablet by mouth Daily., Disp: , Rfl:   •  metFORMIN (GLUCOPHAGE) 1000 MG tablet, Take 1,000 mg by mouth 2 (Two)  Times a Day With Meals., Disp: , Rfl:   •  Morphine Sulfate ER 15 MG tablet extended-release, Take 1 tablet by mouth Daily., Disp: , Rfl:   •  nitroglycerin (NITROSTAT) 0.4 MG SL tablet, Place 1 tablet under the tongue Every 5 (Five) Minutes As Needed for Chest Pain. Take no more than 3 doses in 15 minutes., Disp: 90 tablet, Rfl: 0  •  ondansetron ODT (ZOFRAN-ODT) 4 MG disintegrating tablet, , Disp: , Rfl:   •  oxyCODONE (ROXICODONE) 10 MG tablet, Take 10 mg by mouth Every 8 (Eight) Hours As Needed for Moderate Pain (4-6)., Disp: , Rfl:   •  ranolazine (RANEXA) 500 MG 12 hr tablet, , Disp: , Rfl:   •  tiotropium (SPIRIVA HANDIHALER) 18 MCG per inhalation capsule, Place 1 capsule into inhaler and inhale Daily., Disp: , Rfl:   •  tiZANidine (ZANAFLEX) 4 MG tablet, Take 4 mg by mouth At Night As Needed for Muscle Spasms., Disp: , Rfl:   •  topiramate (Topamax) 50 MG tablet, Take 1 tablet in the morning and 1.5 tablets at night (Patient taking differently: 50 mg 2 (Two) Times a Day. Take 1 tablet in the morning and 1.5 tablets at night), Disp: 75 tablet, Rfl: 5  •  traZODone (DESYREL) 50 MG tablet, Take 50 mg by mouth Every Night., Disp: , Rfl: .      The patient's relevant past medical, surgical, family and social history were reviewed and updated in Epic as appropriate.       Review of Systems   HENT: Positive for trouble swallowing.    Eyes: Positive for pain and visual disturbance.   Respiratory: Positive for apnea and chest tightness.    Cardiovascular: Positive for palpitations.   Gastrointestinal: Positive for abdominal pain.   Musculoskeletal: Positive for arthralgias, back pain, joint swelling, neck pain and neck stiffness.   Allergic/Immunologic: Positive for environmental allergies.   Neurological: Positive for dizziness, weakness, numbness and headaches.   Psychiatric/Behavioral: Positive for decreased concentration and sleep disturbance. The patient is nervous/anxious.    All other systems reviewed and are  negative.        Objective:    Physical Exam  Constitutional:       Appearance: Normal appearance. She is obese.   HENT:      Head: Normocephalic and atraumatic.      Mouth/Throat:      Pharynx: Oropharynx is clear.      Comments: Class 3 airway  Pulmonary:      Effort: Pulmonary effort is normal. No respiratory distress.   Skin:     General: Skin is dry.      Coloration: Skin is not jaundiced or pale.      Findings: No erythema.   Neurological:      Mental Status: She is alert and oriented to person, place, and time.   Psychiatric:         Mood and Affect: Mood normal.         Behavior: Behavior normal.         Thought Content: Thought content normal.         Judgment: Judgment normal.     22/30 days of use  Greater than 4-hour use 46.7  90% pressure 9.7  AHI 0.7  3618      ASSESSMENT/PLAN    Diagnoses and all orders for this visit:    1. MARY (obstructive sleep apnea) (Primary)            1. Counseled patient regarding multimodal approach with healthy nutrition, healthy sleep, regular physical activity, social activities, counseling, and medications. Encouraged to practice lateral  sleep position. Avoid alcohol and sedatives close to bedtime.  2.   Patient to increase compliance x2 weeks and I will see her back in 3 weeks to reassess compliance and be able to document.  Patient gave verbal consent today for video visit.  I have reviewed the results of my evaluation and impression and discussed my recommendations in detail with the patient.      Signed by  JUAN Richard    January 19, 2021      CC: Celeste Sadler APRN          No ref. provider found

## 2021-01-21 LAB — CREAT BLDA-MCNC: 1.2 MG/DL (ref 0.6–1.3)

## 2022-06-15 ENCOUNTER — OFFICE VISIT (OUTPATIENT)
Dept: ORTHOPEDIC SURGERY | Facility: CLINIC | Age: 52
End: 2022-06-15

## 2022-06-15 VITALS
HEIGHT: 64 IN | SYSTOLIC BLOOD PRESSURE: 118 MMHG | DIASTOLIC BLOOD PRESSURE: 72 MMHG | WEIGHT: 249 LBS | BODY MASS INDEX: 42.51 KG/M2

## 2022-06-15 DIAGNOSIS — M16.11 ARTHRITIS OF RIGHT HIP: ICD-10-CM

## 2022-06-15 DIAGNOSIS — M25.551 RIGHT HIP PAIN: Primary | ICD-10-CM

## 2022-06-15 DIAGNOSIS — Z72.0 TOBACCO ABUSE: ICD-10-CM

## 2022-06-15 DIAGNOSIS — E66.01 CLASS 3 SEVERE OBESITY DUE TO EXCESS CALORIES WITHOUT SERIOUS COMORBIDITY WITH BODY MASS INDEX (BMI) OF 40.0 TO 44.9 IN ADULT: ICD-10-CM

## 2022-06-15 PROCEDURE — 99203 OFFICE O/P NEW LOW 30 MIN: CPT | Performed by: PHYSICIAN ASSISTANT

## 2022-06-15 RX ORDER — AZELASTINE HYDROCHLORIDE 0.5 MG/ML
SOLUTION/ DROPS OPHTHALMIC
COMMUNITY
Start: 2022-04-17 | End: 2023-03-20

## 2022-06-15 RX ORDER — AZELASTINE 1 MG/ML
SPRAY, METERED NASAL
COMMUNITY
Start: 2022-04-13

## 2022-06-15 NOTE — PROGRESS NOTES
[facility] Note        Subjective     Pain of the Right Hip      HPI    Kelsi Costa is a 51 y.o. female.  This is a very pleasant patient presenting to discuss her right hip pain.  She denies any trauma or injury.  She reports she has been having pain for approximately a year.  His groin pain with weightbearing walking as well as rest pain and night pain.  No lumbar pain or lateral pain.  Pain is 3/10 and aching throbbing stabbing and shooting.  She is here for second opinion.  She has been seen at [facility].  She has done physical therapy as well as sees pain management and is on oxycodone on a regular basis.  She received intra-articular cortisone injection on 2022 which repeat she reports improved her pain for approximately 1 week.  She was told she needs hip replacement here for another opinion.  She is status post bilateral knees with Dr. Oneal    Past Medical History:   Diagnosis Date   • Angina at rest ([facility])    • Arthritis of neck    • Asthma    • Back pain    • Bursitis of hip    • Cervical disc disorder    • Diabetes mellitus (HCC)    • Fibromyalgia    • Hip arthrosis    • Hyperlipidemia    • Hypertension    • Knee swelling    • Low back strain    • Lumbosacral disc disease    • Mitral valve prolapse    • Tear of meniscus of knee    • Tendinitis of knee    • Vertigo       Past Surgical History:   Procedure Laterality Date   • CARDIAC CATHETERIZATION N/A 2017    Procedure: Left Heart Cath;  Surgeon: Darnell Allen MD;  Location: [facility] INVASIVE LOCATION;  Service:    •  SECTION     • FEMORAL ARTERY REPAIR     • HYSTERECTOMY     • JOINT REPLACEMENT  L knee  R knee ?    • KNEE ARTHROPLASTY, PARTIAL REPLACEMENT     • TOTAL KNEE ARTHROPLASTY Right    • TRIGGER POINT INJECTION  May 2022 in R hip      Family History   Problem Relation Age of Onset   • Anesthesia problems Mother     • Cancer Mother    • Diabetes Father    • Diabetes Maternal Grandmother    • Osteoporosis Maternal Grandmother    • Scoliosis Brother      Social History     Socioeconomic History   • Marital status:    Tobacco Use   • Smoking status: Current Every Day Smoker     Types: Cigars     Start date: 6/16/2017   • Smokeless tobacco: Never Used   Vaping Use   • Vaping Use: Never used   Substance and Sexual Activity   • Alcohol use: Yes     Alcohol/week: 2.0 standard drinks     Types: 2 Glasses of wine per week     Comment: Occasionally   • Drug use: No   • Sexual activity: Defer      Current Outpatient Medications on File Prior to Visit   Medication Sig Dispense Refill   • amitriptyline (ELAVIL) 10 MG tablet Take 10 mg by mouth Every Night.     • atorvastatin (LIPITOR) 80 MG tablet TK 1 T PO HS     • azelastine (ASTELIN) 0.1 % nasal spray INSTILL 1 SPRAY INTO EACH NOSTRIL EVERY DAY TO FOUR TIMES DAILY     • azelastine (OPTIVAR) 0.05 % ophthalmic solution INSTILL 1 DROP IN EACH EYE TWICE DAILY     • budesonide (RINOCORT AQUA) 32 MCG/ACT nasal spray 1 spray into each nostril 2 (Two) Times a Day.     • diltiaZEM (CARDIZEM) 60 MG tablet Take 60 mg by mouth Daily.     • estradiol (ESTRACE) 1 MG tablet Take 1 mg by mouth Daily.     • ezetimibe (ZETIA) 10 MG tablet Take 10 mg by mouth Daily.     • fluticasone (FLONASE) 50 MCG/ACT nasal spray 2 sprays into each nostril Daily.     • gabapentin (NEURONTIN) 800 MG tablet Take 800 mg by mouth 2 (two) times a day.     • losartan-hydrochlorothiazide (HYZAAR) 100-25 MG per tablet Take 1 tablet by mouth Daily.     • metFORMIN (GLUCOPHAGE) 1000 MG tablet Take 500 mg by mouth 2 (Two) Times a Day With Meals.     • nitroglycerin (NITROSTAT) 0.4 MG SL tablet Place 1 tablet under the tongue Every 5 (Five) Minutes As Needed for Chest Pain. Take no more than 3 doses in 15 minutes. 90 tablet 0   • ondansetron ODT (ZOFRAN-ODT) 4 MG disintegrating tablet      • oxyCODONE (ROXICODONE) 10 MG  "tablet Take 10 mg by mouth Every 8 (Eight) Hours As Needed for Moderate Pain (4-6).     • ranolazine (RANEXA) 500 MG 12 hr tablet      • tiotropium (SPIRIVA) 18 MCG per inhalation capsule Place 1 capsule into inhaler and inhale Daily.     • topiramate (Topamax) 50 MG tablet Take 1 tablet in the morning and 1.5 tablets at night (Patient taking differently: 50 mg 2 (Two) Times a Day. Take 1 tablet in the morning and 1.5 tablets at night) 75 tablet 5     No current facility-administered medications on file prior to visit.      Allergies   Allergen Reactions   • Levaquin [Levofloxacin] Itching and Rash          Review of Systems   Constitutional: Positive for activity change.   HENT: Positive for ear pain and sinus pressure.    Eyes: Positive for redness, itching and visual disturbance.   Respiratory: Positive for shortness of breath.    Cardiovascular: Positive for palpitations and leg swelling.   Gastrointestinal: Negative.    Endocrine: Positive for cold intolerance.   Genitourinary: Negative.    Musculoskeletal: Positive for arthralgias, back pain, joint swelling, neck pain and neck stiffness.   Skin: Negative.    Allergic/Immunologic: Positive for environmental allergies.   Neurological: Positive for dizziness, weakness, light-headedness, numbness and headaches.   Hematological: Negative.    Psychiatric/Behavioral: Negative.         I reviewed the patient's chief complaint, history of present illness, review of systems, past medical history, surgical history, family history, social history, medications and allergy list.        Objective      Physical Exam  /72   Ht 162.6 cm (64.02\")   Wt 113 kg (249 lb)   BMI 42.72 kg/m²     Body mass index is 42.72 kg/m².    General  Mental Status - alert  General Appearance - cooperative, well groomed, not in acute distress  Orientation - Oriented X3  Build & Nutrition - well developed and well nourished  Posture - normal posture  Gait -antalgic       Ortho Exam  Right  " hip    RANGE OF MOTION:   FLEXION CONTRACTURE: None   FLEXION: 110 degrees   INTERNAL ROTATION: 20 degrees at 90 degrees of flexion   EXTERNAL ROTATION: 40 degrees at 90 degrees of flexion    PAIN WITH HIP MOTION: Yes PAIN WITH LOGROLL: Yes STINCHFIELD TEST: Positive  STRENGTH:  5/5 hip adduction, abduction, flexion. 5/5 strength knee flexion, extension. 5/5 strength ankle dorsiflexion and plantarflexion.     GREATER TROCHANTER BURSAL PAIN: Mild  SENSATION TO LIGHT TOUCH:  DEEP PERONEAL/SUPERFICIAL PERONEAL/SURAL/SAPHENOUS/TIBIAL:  intact      STRAIGHT LEG TEST:   negative          Imaging/Studies  Imaging Results (Last 24 Hours)     Procedure Component Value Units Date/Time    XR Hip With or Without Pelvis 2 - 3 View Right [[id number]] Resulted: 06/15/22 1635     Updated: 06/15/22 1635    Narrative:      Right Hip Radiographs  Indication: right hip pain  Views: low AP pelvis and lateral of the right hip    Comparison: no prior studies available for review    Findings:   Arthritic changes, with calcification labrum, inferior acetabular   osteophyte, no acute bony abnormalities.  No unusual bony features.            Assessment    Assessment:  1. Right hip pain    2. Arthritis of right hip    3. Class 3 severe obesity due to excess calories without serious comorbidity with body mass index (BMI) of 40.0 to 44.9 in adult (HCC)    4. Tobacco abuse          Plan:  Recommend over-the-counter medication as needed for discomfort  Right hip arthritis.  I reviewed today's x-rays clinical findings past and current treatment.  She did receive intra-articular injection on 5/25/2022 which gave her 1 week relief.  I do think her pain and functional rotations are coming from her hip joint.  We discussed treatment and including continued medication, hip replacement.  I told her she has to wait 3 months following an intra-articular injection to consider hip replacement.  Unfortunately, I do not have a surgeon here that could do her hip  replacement.  We discussed that she needs to have a BMI less than 40 as well as to needs to quit smoking.  We discussed the reasons for this and the increased risk of complication and infection.  She is status post knees with Dr. Oneal in the past and she may want to consider returning to [facility] orth[facility].  She will return to see us as needed  Patient has an elevated BMI greater than 40.  This places the patient at increased risk for perioperative complications as well as increased risk of accelerating the degenerative processes within the joint.  As a result, surgical intervention will be deferred until the patient can achieve a BMI less than 40.  In the interval, the patient has been instructed on weight loss avenues including diet, portion control, calorie restriction, low/no impact exercise, referral to weight loss management and/or bariatric surgery.  It was explained that weight loss can improve joint pain alone by decreasing the joint reaction forces.  For every pound of weight change, the knee and hip joints see a 4 to 5 fold change in pressure.  Given these options, the patient will proceed with diet and low impact exercise.  I discussed the importance of smoking cessation and the increased surgical risk and increased surgical complications with smoking. I explained the risks of smoking, including hardening of the arteries, gangrene, amputation.  I explained smoking poisons bone cells and increases the risk of nonunion of fractures and fusions.  (3 minutes)    Patient history, diagnosis and treatment plan discussed with Dr. Acevedo.            Patience Quispe PA-C  06/16/22  12:01 EDT

## 2022-06-27 RX ORDER — SODIUM, POTASSIUM,MAG SULFATES 17.5-3.13G
2 SOLUTION, RECONSTITUTED, ORAL ORAL TAKE AS DIRECTED
Qty: 354 ML | Refills: 0 | Status: SHIPPED | OUTPATIENT
Start: 2022-06-27 | End: 2023-01-27

## 2022-07-01 ENCOUNTER — OUTSIDE FACILITY SERVICE (OUTPATIENT)
Dept: GASTROENTEROLOGY | Facility: CLINIC | Age: 52
End: 2022-07-01

## 2022-07-01 PROCEDURE — 88305 TISSUE EXAM BY PATHOLOGIST: CPT | Performed by: INTERNAL MEDICINE

## 2022-07-01 PROCEDURE — 45385 COLONOSCOPY W/LESION REMOVAL: CPT | Performed by: INTERNAL MEDICINE

## 2022-07-05 ENCOUNTER — LAB REQUISITION (OUTPATIENT)
Dept: LAB | Facility: HOSPITAL | Age: 52
End: 2022-07-05

## 2022-07-05 DIAGNOSIS — Z83.71 FAMILY HISTORY OF COLONIC POLYPS: ICD-10-CM

## 2022-07-05 DIAGNOSIS — D12.3 BENIGN NEOPLASM OF TRANSVERSE COLON: ICD-10-CM

## 2022-07-05 DIAGNOSIS — K57.30 DIVERTICULOSIS OF LARGE INTESTINE WITHOUT PERFORATION OR ABSCESS WITHOUT BLEEDING: ICD-10-CM

## 2022-07-05 DIAGNOSIS — R19.4 CHANGE IN BOWEL HABIT: ICD-10-CM

## 2022-07-06 LAB
CYTO UR: NORMAL
LAB AP CASE REPORT: NORMAL
LAB AP CLINICAL INFORMATION: NORMAL
PATH REPORT.FINAL DX SPEC: NORMAL
PATH REPORT.GROSS SPEC: NORMAL

## 2022-10-13 ENCOUNTER — TELEPHONE (OUTPATIENT)
Dept: NEUROLOGY | Facility: CLINIC | Age: 52
End: 2022-10-13

## 2022-10-13 NOTE — TELEPHONE ENCOUNTER
NEW REFERRAL IN [place]          Best call back number: [phone]    Who is your current provider:  DR POLANCO LAST SEEN ON 1/8/2021    Who would you like your new provider to be:  DR ORTIZ     What are your reasons for transferring care:  NO REASON LISTED DOES HAVE NEW DX     Additional notes:  MESSAGE SENT TO DR ORTIZ ALSO ON REFERRAL

## 2022-10-14 NOTE — TELEPHONE ENCOUNTER
Talked to patient and let her know that I send her message to Dr. Martinez and  Dr. Nolan to check if they agree on switching providers, both of them agreed. I mention to the patient she just needed to call Dr. Nolan's office and make an appointment. Give her their office number. Patient appreciated the call and had verbal understanding.

## 2022-10-19 ENCOUNTER — OFFICE VISIT (OUTPATIENT)
Dept: ORTHOPEDIC SURGERY | Facility: CLINIC | Age: 52
End: 2022-10-19

## 2022-10-19 VITALS
BODY MASS INDEX: 42.51 KG/M2 | DIASTOLIC BLOOD PRESSURE: 72 MMHG | WEIGHT: 249 LBS | SYSTOLIC BLOOD PRESSURE: 120 MMHG | HEIGHT: 64 IN

## 2022-10-19 DIAGNOSIS — M19.012 ARTHRITIS OF LEFT ACROMIOCLAVICULAR JOINT: ICD-10-CM

## 2022-10-19 DIAGNOSIS — M75.22 BICEPS TENDONITIS ON LEFT: ICD-10-CM

## 2022-10-19 DIAGNOSIS — M25.512 LEFT SHOULDER PAIN, UNSPECIFIED CHRONICITY: Primary | ICD-10-CM

## 2022-10-19 PROCEDURE — 20550 NJX 1 TENDON SHEATH/LIGAMENT: CPT | Performed by: PHYSICIAN ASSISTANT

## 2022-10-19 PROCEDURE — 99214 OFFICE O/P EST MOD 30 MIN: CPT | Performed by: PHYSICIAN ASSISTANT

## 2022-10-19 RX ORDER — OXYCODONE HYDROCHLORIDE AND ACETAMINOPHEN 5; 325 MG/1; MG/1
TABLET ORAL
COMMUNITY
Start: 2022-09-29 | End: 2023-03-20

## 2022-10-19 RX ORDER — NALOXEGOL OXALATE 25 MG/1
TABLET, FILM COATED ORAL
COMMUNITY
Start: 2022-08-31

## 2022-10-19 RX ORDER — METHOCARBAMOL 500 MG/1
TABLET, FILM COATED ORAL
COMMUNITY
Start: 2022-10-03

## 2022-10-19 RX ORDER — EZETIMIBE 10 MG/1
10 TABLET ORAL DAILY
COMMUNITY
Start: 2022-08-31 | End: 2023-03-20

## 2022-10-19 RX ORDER — SEMAGLUTIDE 1.34 MG/ML
INJECTION, SOLUTION SUBCUTANEOUS
COMMUNITY
Start: 2022-09-19

## 2022-10-19 RX ORDER — ALBUTEROL SULFATE 90 UG/1
AEROSOL, METERED RESPIRATORY (INHALATION) SEE ADMIN INSTRUCTIONS
COMMUNITY
Start: 2022-08-31

## 2022-10-19 RX ADMIN — TRIAMCINOLONE ACETONIDE 40 MG: 40 INJECTION, SUSPENSION INTRA-ARTICULAR; INTRAMUSCULAR at 14:31

## 2022-10-19 RX ADMIN — LIDOCAINE HYDROCHLORIDE 4 ML: 10 INJECTION, SOLUTION EPIDURAL; INFILTRATION; INTRACAUDAL; PERINEURAL at 14:31

## 2022-10-19 NOTE — PROGRESS NOTES
Procedure   Large Joint Arthrocentesis  Date/Time: 10/19/2022 2:31 PM  Consent given by: patient  Site marked: site marked  Timeout: Immediately prior to procedure a time out was called to verify the correct patient, procedure, equipment, support staff and site/side marked as required   Supporting Documentation  Indications: pain   Procedure Details  Location: shoulder (Bicep Tendon) -   Preparation: Patient was prepped and draped in the usual sterile fashion  Needle size: 22 G  Approach: anterior  Medications administered: 4 mL lidocaine PF 1% 1 %; 40 mg triamcinolone acetonide 40 MG/ML  Patient tolerance: patient tolerated the procedure well with no immediate complications

## 2022-10-19 NOTE — PROGRESS NOTES
[facility] [facility] [place]    Subjective     Chief Complaint   Patient presents with   • Follow-up     L shoulder pain-constant for a month now.            HPI  Kelsi Costa is a 52 y.o. female.  ***    Past Medical History:   Diagnosis Date   • Angina at rest ([facility])    • Arthritis of neck    • Asthma    • Back pain    • Bursitis of hip    • Cervical disc disorder    • Diabetes mellitus (HCC)    • Fibromyalgia    • Hip arthrosis    • Hyperlipidemia    • Hypertension    • Knee swelling    • Low back strain    • Lumbosacral disc disease    • Mitral valve prolapse    • Tear of meniscus of knee    • Tendinitis of knee    • Vertigo       Past Surgical History:   Procedure Laterality Date   • CARDIAC CATHETERIZATION N/A 2017    Procedure: Left Heart Cath;  Surgeon: Darnell Allen MD;  Location: [facility] INVASIVE LOCATION;  Service:    •  SECTION     • FEMORAL ARTERY REPAIR     • HYSTERECTOMY     • JOINT REPLACEMENT  L knee  R knee ?    • KNEE ARTHROPLASTY, PARTIAL REPLACEMENT     • TOTAL KNEE ARTHROPLASTY Right    • TRIGGER POINT INJECTION  May 2022 in R hip      Family History   Problem Relation Age of Onset   • Anesthesia problems Mother    • Cancer Mother    • Diabetes Father    • Diabetes Maternal Grandmother    • Osteoporosis Maternal Grandmother    • Scoliosis Brother      Social History     Socioeconomic History   • Marital status:    Tobacco Use   • Smoking status: Every Day     Types: Cigars     Start date: 2017   • Smokeless tobacco: Never   Vaping Use   • Vaping Use: Never used   Substance and Sexual Activity   • Alcohol use: Yes     Alcohol/week: 2.0 standard drinks     Types: 2 Glasses of wine per week     Comment: Occasionally   • Drug use: No   • Sexual activity: Defer      Current Outpatient Medications on File Prior to Visit   Medication Sig Dispense Refill   • amitriptyline (ELAVIL) 10 MG tablet Take  10 mg by mouth Every Night.     • atorvastatin (LIPITOR) 80 MG tablet TK 1 T PO HS     • azelastine (ASTELIN) 0.1 % nasal spray INSTILL 1 SPRAY INTO EACH NOSTRIL EVERY DAY TO FOUR TIMES DAILY     • azelastine (OPTIVAR) 0.05 % ophthalmic solution INSTILL 1 DROP IN EACH EYE TWICE DAILY     • budesonide (RINOCORT AQUA) 32 MCG/ACT nasal spray 1 spray into each nostril 2 (Two) Times a Day.     • diltiaZEM (CARDIZEM) 60 MG tablet Take 60 mg by mouth Daily.     • estradiol (ESTRACE) 1 MG tablet Take 1 mg by mouth Daily.     • fluticasone (FLONASE) 50 MCG/ACT nasal spray 2 sprays into each nostril Daily.     • gabapentin (NEURONTIN) 800 MG tablet Take 800 mg by mouth 2 (two) times a day.     • levocetirizine (XYZAL) 5 MG tablet Take 5 mg by mouth Every Evening.     • losartan-hydrochlorothiazide (HYZAAR) 100-25 MG per tablet Take 1 tablet by mouth Daily.     • metFORMIN (GLUCOPHAGE) 1000 MG tablet Take 500 mg by mouth 2 (Two) Times a Day With Meals.     • nitroglycerin (NITROSTAT) 0.4 MG SL tablet Place 1 tablet under the tongue Every 5 (Five) Minutes As Needed for Chest Pain. Take no more than 3 doses in 15 minutes. 90 tablet 0   • ondansetron ODT (ZOFRAN-ODT) 4 MG disintegrating tablet      • oxyCODONE (ROXICODONE) 10 MG tablet Take 10 mg by mouth Every 8 (Eight) Hours As Needed for Moderate Pain (4-6).     • Ozempic, 0.25 or 0.5 MG/DOSE, 2 MG/1.5ML solution pen-injector      • predniSONE (DELTASONE) 20 MG tablet Take 1 tablet by mouth 2 (Two) Times a Day. 10 tablet 0   • ranolazine (RANEXA) 500 MG 12 hr tablet      • sodium-potassium-magnesium sulfates (Suprep Bowel Prep Kit) 17.5-3.13-1.6 GM/177ML solution oral solution Take 2 bottles by mouth Take As Directed. Do not eat the day before your procedure. If you didn't receive instructions call [phone]. 354 mL 0   • topiramate (Topamax) 50 MG tablet Take 1 tablet in the morning and 1.5 tablets at night (Patient taking differently: 1 tablet 2 (Two) Times a Day. Take  "1 tablet in the morning and 1.5 tablets at night) 75 tablet 5   • ezetimibe (ZETIA) 10 MG tablet Take 1 tablet by mouth Daily.     • methocarbamol (ROBAXIN) 500 MG tablet      • Movantik 25 MG tablet TAKE 1/2 TABLET BY MOUTH EVERY DAY FOR CONSTIPATION     • oxyCODONE-acetaminophen (PERCOCET) 5-325 MG per tablet      • Ventolin  (90 Base) MCG/ACT inhaler Inhale See Admin Instructions. Inhale 2 puffs by mouth every 4 to 6 hours as needed       No current facility-administered medications on file prior to visit.      Allergies   Allergen Reactions   • Levaquin [Levofloxacin] Itching and Rash        The following portions of the patient's history were reviewed and updated as appropriate: {history reviewed:20406::\"allergies\",\"current medications\",\"past family history\",\"past medical history\",\"past social history\",\"past surgical history\",\"problem list\"}.    Review of Systems   Musculoskeletal: Positive for arthralgias.   All other systems reviewed and are negative.       Objective      Physical Exam  /72   Ht 162.6 cm (64.02\")   Wt 113 kg (249 lb)   BMI 42.72 kg/m²     Body mass index is 42.72 kg/m².    GENERAL APPEARANCE: awake, alert & oriented x 3, in no acute distress and well developed, well nourished  PSYCH: normal mood and affect  LUNGS:  breathing nonlabored, no wheezing  EYES: sclera anicteric, pupils equal  CARDIOVASCULAR: palpable pulses. Capillary refill less than 2 seconds  INTEGUMENTARY: skin intact, no clubbing, cyanosis  NEUROLOGIC:  Normal Sensation and reflexes       Ortho Exam  ***    Imaging/Studies  Imaging Results (Last 7 Days)     ** No results found for the last 168 hours. **          Assessment/Plan      No diagnosis found.    No orders of the defined types were placed in this encounter.       ***    Medical Decision Making  Management Options : {[facility] - Management Options:07537}  Data/Risk: {[facility] - Data/Risk:15212}    Hanny Iyer, PCT  10/19/22  13:59 EDT        Kevin Arauz, " M.D., CLIVEOS  Orthopedic Surgeon  Fellowship Trained Sports Medicine  [facility]  Orthopedics and Sports Medicine  [address], [address]  [place] [address]         EMR Dragon/Transcription disclaimer:  Much of this encounter note is an electronic transcription of spoken language to printed text. Electronic transcription of spoken language may permit erroneous, or at times, nonsensical words or phrases to be inadvertently transcribed. Although I have reviewed the note for such errors, some may still exist.

## 2022-10-21 RX ORDER — LIDOCAINE HYDROCHLORIDE 10 MG/ML
4 INJECTION, SOLUTION EPIDURAL; INFILTRATION; INTRACAUDAL; PERINEURAL
Status: COMPLETED | OUTPATIENT
Start: 2022-10-19 | End: 2022-10-19

## 2022-10-21 RX ORDER — TRIAMCINOLONE ACETONIDE 40 MG/ML
40 INJECTION, SUSPENSION INTRA-ARTICULAR; INTRAMUSCULAR
Status: COMPLETED | OUTPATIENT
Start: 2022-10-19 | End: 2022-10-19

## 2022-10-21 NOTE — PROGRESS NOTES
[facility] [place] [facility] Note        Subjective     Follow-up (L shoulder pain-constant for a month now.//)      HPI    Kelsi Costa is a 52 y.o. female.  Patient returns today with a new problem.  She is here with complaints of left shoulder pain for approximately 6 months.  She reports a fall at that time.  She complains of pain 3/10.  Is anterior shoulder.  It is stabbing and aching.  She has stiffness and swelling.  He has gone to physical therapy where they focused on her cervical spine.  She reports her most comfortable position is with her arm raised overhead.  Letting it hang down is painful.  She has pain lifting.    Past Medical History:   Diagnosis Date   • Angina at rest ([facility])    • Arthritis of neck    • Asthma    • Back pain    • Bursitis of hip    • Cervical disc disorder    • Diabetes mellitus ([facility])    • Fibromyalgia    • Hip arthrosis    • Hyperlipidemia    • Hypertension    • Knee swelling    • Low back strain    • Lumbosacral disc disease    • Mitral valve prolapse    • Tear of meniscus of knee    • Tendinitis of knee    • Vertigo       Past Surgical History:   Procedure Laterality Date   • CARDIAC CATHETERIZATION N/A 2017    Procedure: Left Heart Cath;  Surgeon: Darnell Allen MD;  [facility];  Service:    •  SECTION     • FEMORAL ARTERY REPAIR     • HYSTERECTOMY     • JOINT REPLACEMENT  L knee  R knee ?    • KNEE ARTHROPLASTY, PARTIAL REPLACEMENT     • TOTAL KNEE ARTHROPLASTY Right    • TRIGGER POINT INJECTION  May 2022 in R hip      Family History   Problem Relation Age of Onset   • Anesthesia problems Mother    • Cancer Mother    • Diabetes Father    • Diabetes Maternal Grandmother    • Osteoporosis Maternal Grandmother    • Scoliosis Brother      Social History     Socioeconomic History   • Marital status:    Tobacco Use   • Smoking status: Every Day     Types: Cigars      Start date: 6/16/2017   • Smokeless tobacco: Never   Vaping Use   • Vaping Use: Never used   Substance and Sexual Activity   • Alcohol use: Yes     Alcohol/week: 2.0 standard drinks     Types: 2 Glasses of wine per week     Comment: Occasionally   • Drug use: No   • Sexual activity: Defer      Current Outpatient Medications on File Prior to Visit   Medication Sig Dispense Refill   • amitriptyline (ELAVIL) 10 MG tablet Take 10 mg by mouth Every Night.     • atorvastatin (LIPITOR) 80 MG tablet TK 1 T PO HS     • azelastine (ASTELIN) 0.1 % nasal spray INSTILL 1 SPRAY INTO EACH NOSTRIL EVERY DAY TO FOUR TIMES DAILY     • azelastine (OPTIVAR) 0.05 % ophthalmic solution INSTILL 1 DROP IN EACH EYE TWICE DAILY     • budesonide (RINOCORT AQUA) 32 MCG/ACT nasal spray 1 spray into each nostril 2 (Two) Times a Day.     • diltiaZEM (CARDIZEM) 60 MG tablet Take 60 mg by mouth Daily.     • estradiol (ESTRACE) 1 MG tablet Take 1 mg by mouth Daily.     • fluticasone (FLONASE) 50 MCG/ACT nasal spray 2 sprays into each nostril Daily.     • gabapentin (NEURONTIN) 800 MG tablet Take 800 mg by mouth 2 (two) times a day.     • levocetirizine (XYZAL) 5 MG tablet Take 5 mg by mouth Every Evening.     • losartan-hydrochlorothiazide (HYZAAR) 100-25 MG per tablet Take 1 tablet by mouth Daily.     • metFORMIN (GLUCOPHAGE) 1000 MG tablet Take 500 mg by mouth 2 (Two) Times a Day With Meals.     • nitroglycerin (NITROSTAT) 0.4 MG SL tablet Place 1 tablet under the tongue Every 5 (Five) Minutes As Needed for Chest Pain. Take no more than 3 doses in 15 minutes. 90 tablet 0   • ondansetron ODT (ZOFRAN-ODT) 4 MG disintegrating tablet      • oxyCODONE (ROXICODONE) 10 MG tablet Take 10 mg by mouth Every 8 (Eight) Hours As Needed for Moderate Pain (4-6).     • Ozempic, 0.25 or 0.5 MG/DOSE, 2 MG/1.5ML solution pen-injector      • predniSONE (DELTASONE) 20 MG tablet Take 1 tablet by mouth 2 (Two) Times a Day. 10 tablet 0   • ranolazine (RANEXA) 500 MG  "12 hr tablet      • sodium-potassium-magnesium sulfates (Suprep Bowel Prep Kit) 17.5-3.13-1.6 GM/177ML solution oral solution Take 2 bottles by mouth Take As Directed. Do not eat the day before your procedure. If you didn't receive instructions call [phone]. 354 mL 0   • topiramate (Topamax) 50 MG tablet Take 1 tablet in the morning and 1.5 tablets at night (Patient taking differently: 1 tablet 2 (Two) Times a Day. Take 1 tablet in the morning and 1.5 tablets at night) 75 tablet 5   • ezetimibe (ZETIA) 10 MG tablet Take 1 tablet by mouth Daily.     • methocarbamol (ROBAXIN) 500 MG tablet      • Movantik 25 MG tablet TAKE 1/2 TABLET BY MOUTH EVERY DAY FOR CONSTIPATION     • oxyCODONE-acetaminophen (PERCOCET) 5-325 MG per tablet      • Ventolin  (90 Base) MCG/ACT inhaler Inhale See Admin Instructions. Inhale 2 puffs by mouth every 4 to 6 hours as needed       No current facility-administered medications on file prior to visit.      Allergies   Allergen Reactions   • Levaquin [Levofloxacin] Itching and Rash          Review of Systems     I reviewed the patient's chief complaint, history of present illness, review of systems, past medical history, surgical history, family history, social history, medications and allergy list.        Objective      Physical Exam  /72   Ht 162.6 cm (64.02\")   Wt 113 kg (249 lb)   BMI 42.72 kg/m²     Body mass index is 42.72 kg/m².    General  Mental Status - alert  General Appearance - cooperative, well groomed, not in acute distress  Orientation - Oriented X3  Build & Nutrition - well developed and well nourished  Posture - normal posture         Ortho Exam  Musculoskeletal   Upper Extremity   Left Shoulder     Inspection and Palpation:     Tenderness -tender over the bicipital groove.  Mild AC tenderness.  Mild trapezius tenderness.  No cervical tenderness no scapular tenderness    Crepitus - none    Sensation is normal    Examination reveals no ecchymosis.  "     Strength and Tone:    Supraspinatus -5/5    External Rotators-5/5    Infraspinatus - 5/5    Subscapularis -5/5    Deltoid - 5/5     Range of Motion   Right shoulder:    Internal Rotation: ROM - T7    External Rotation: AROM - 80 degrees    Elevation through flexion: AROM - 180 degrees    Left Shoulder:    Internal Rotation: ROM -T7    External Rotation: AROM -80 degrees    Elevation through flexion: AROM -180 degrees          Impingement   Left shoulder    Gutierrez-Johann impingement test negative    Neer impingement test negative     Functional Testing   Left shoulder    AC crossover adduction test negative    Abdominal compression test negative    Lift-off sign negative             Assessment    Assessment:  1. Left shoulder pain, unspecified chronicity    2. Biceps tendonitis on left    3. Arthritis of left acromioclavicular joint          Plan:  1. Recommend over-the-counter medication as needed for discomfort  2. Left biceps tendinitis.  I reviewed today's x-rays) past and current treatment the patient.  X-rays show hypertrophy at the AC joint with no acute bony findings.  I think her pain and functional rotations are secondary to biceps tendinitis.  She has normal range of motion and strength of the shoulder with negative Gutierrez.  Recommendation today is cortisone injection into the tendon sheath followed by round of physical therapy.  She will return to see me in 6 to 8 weeks or sooner if needed.    I discussed with the patient the potential benefits of performing a therapeutic injection of the left biceps tendon sheath as well as potential risks including but not limited to infection, swelling, pain, bleeding, bruising, nerve/vessel damage, skin color changes, transient elevation in blood glucose levels, and fat atrophy. After informed consent and verifying correct patient, procedure site, and type of procedure, the area was prepped with Hibiclens, ethyl chloride was used to numb the skin. Via the  anterior approach, 4cc of 1% lidocaine and  40mg/ml of Kenalog were injected into the left biceps tendon sheath. The patient tolerated the procedure well. There were no complicatio      Patient history, diagnosis and treatment plan discussed with Dr. Acevedo.          Patience Quispe PA-C  10/21/22  06:50 EDT

## 2022-11-14 RX ORDER — TOPIRAMATE 50 MG/1
TABLET, FILM COATED ORAL
Qty: 75 TABLET | Refills: 5 | Status: SHIPPED | OUTPATIENT
Start: 2022-11-14 | End: 2023-03-20

## 2022-11-14 NOTE — TELEPHONE ENCOUNTER
Rx Refill Note  Requested Prescriptions     Pending Prescriptions Disp Refills   • topiramate (TOPAMAX) 50 MG tablet [Pharmacy Med Name: TOPIRAMATE 50MG TABLETS] 75 tablet 5     Sig: TAKE 1 TABLET BY MOUTH EVERY MORNING AND 1 AND 1/2 TABLETS AT BEDTIME      Last filled:  Last office visit with prescribing clinician: Visit date not found      Next office visit with prescribing clinician: 01/20/2023    Valeri Steele MA  11/14/22, 08:53 EST

## 2023-01-27 ENCOUNTER — OFFICE VISIT (OUTPATIENT)
Dept: ORTHOPEDIC SURGERY | Facility: CLINIC | Age: 53
End: 2023-01-27
Payer: MEDICARE

## 2023-01-27 VITALS
WEIGHT: 229 LBS | BODY MASS INDEX: 39.09 KG/M2 | HEIGHT: 64 IN | DIASTOLIC BLOOD PRESSURE: 82 MMHG | SYSTOLIC BLOOD PRESSURE: 130 MMHG

## 2023-01-27 DIAGNOSIS — M25.551 RIGHT HIP PAIN: Primary | ICD-10-CM

## 2023-01-27 DIAGNOSIS — E66.09 CLASS 2 OBESITY DUE TO EXCESS CALORIES WITHOUT SERIOUS COMORBIDITY WITH BODY MASS INDEX (BMI) OF 39.0 TO 39.9 IN ADULT: ICD-10-CM

## 2023-01-27 DIAGNOSIS — Z72.0 TOBACCO ABUSE: ICD-10-CM

## 2023-01-27 DIAGNOSIS — M16.11 ARTHRITIS OF RIGHT HIP: ICD-10-CM

## 2023-01-27 PROCEDURE — 99214 OFFICE O/P EST MOD 30 MIN: CPT | Performed by: PHYSICIAN ASSISTANT

## 2023-01-27 RX ORDER — POTASSIUM CHLORIDE 750 MG/1
10 TABLET, FILM COATED, EXTENDED RELEASE ORAL DAILY
COMMUNITY
Start: 2022-10-23

## 2023-01-27 NOTE — PROGRESS NOTES
"    [facility] [place] [facility] Note        Subjective     CC: Follow-up (Right Hip [place])      HPI    Kelsi Costa is a 52 y.o. female.  Patient (new to me) returns today for follow-up evaluation of her right hip, increasing pain.  She last seen us for her hip back in June 2022 and at that time she was approximately 2 weeks out from an intra-articular corticosteroid injection.  She states the injection helped \"a lot\" for about a week and then symptoms returned.  Patient was told at that appointment she needed to get her BMI under 40 in order to be considered for possible POLLO.  Additionally she needed to stop smoking.  Her BMI is currently 39.3 but she has continued to smoke, even though she has cut down.    Pain scale 3-4/10.  Notes increased pain with walking, standing, stair climbing, sleeping, laying on the affected side, rising from a seated position, movement of hip and back.  Now notes pain to the lateral aspect of the hip that radiates into the groin.  She also has some lateral thigh numbness and tingling which has been ongoing for about 4 weeks.  She states the numbness and tingling is constant.  She has not been evaluated for this as of yet.    Does have history of back issues which she sees pain management for and has been receiving epidural steroid injection as well as RFA.    Overall, patient's symptoms are worsening.    ROS:    Constiutional:Pt denies fever, chills, nausea, or vomiting.  MSK:as above        Objective      Past Medical History  Past Medical History:   Diagnosis Date   • Angina at rest (HCC)    • Arthritis of neck 2020   • Asthma    • Back pain    • Bursitis of hip 2020   • Cervical disc disorder 2020   • Diabetes mellitus (HCC)    • Fibromyalgia    • Hip arthrosis 2020   • Hyperlipidemia    • Hypertension    • Knee swelling 2015   • Low back strain 2005   • Lumbosacral disc disease 2005   • Mitral valve prolapse    • Tear of meniscus of knee 1999   • Tendinitis of knee " "1999   • Vertigo      Social History     Socioeconomic History   • Marital status:    Tobacco Use   • Smoking status: Every Day     Types: Cigars     Start date: 6/16/2017   • Smokeless tobacco: Never   Vaping Use   • Vaping Use: Never used   Substance and Sexual Activity   • Alcohol use: Yes     Alcohol/week: 2.0 standard drinks     Types: 2 Glasses of wine per week     Comment: Occasionally   • Drug use: No   • Sexual activity: Defer          Physical Exam  /82   Ht 162.6 cm (64.02\")   Wt 104 kg (229 lb)   BMI 39.29 kg/m²     Body mass index is 39.29 kg/m².    Patient is well nourished and well developed.        Ortho Exam  Neurologic:   Sensation:    Left foot: Intact to light touch on the dorsal and plantar aspect.  Notes numbness and tingling lateral aspect of thigh.   Motor:    Left lower extremity: 5/5 quadriceps, hamstrings, ankle dorsiflexors, and ankle plantar flexors    Lower Extremity:   Left hip:    Tenderness:  Positive tenderness noted left low back, buttock, lateral aspect of the hip    Swelling:  None    Crepitus:  None    Atrophy:  None    Range of motion:  External Rotation: 20°       Internal Rotation: 20°       Flexion:  100°       Extension:  0°   Instability:  None  Deformities:  None  Functional testing: Positive Stinchfield with pain noted left low back, buttock and groin.  Passive ER/IR hip only slight discomfort noted.    No leg length discrepancy      Imaging/Labs/EMG Reviewed:  Ordered right hip plain films.  Imaging read/interpreted by Dr. Lewis.    Indication: Right hip pain     Comparison: Todays xrays were compared to previous xrays from 6/15/2022     IMPRESSION:      AP pelvis, right hip: Radiographs demonstrate mild to moderate arthritic changes with calcification of the superior lateral labrum.  Small osteophyte formation at the margins of the acetabulum.  Joint spaces remain well preserved with no acute bony injury or fracture.  No evidence of osseous lesions.  " No significant changes compared to prior radiographs.      Assessment:  1. Right hip pain    2. Arthritis of right hip    3. Class 2 obesity due to excess calories without serious comorbidity with body mass index (BMI) of 39.0 to 39.9 in adult    4. Tobacco abuse        Plan:  1. Right hip pain (worsening), hip osteoarthritis but patient also has a history of back issues which could be contributing to her hip pain.  2. Reviewed imaging with the patient.  3. Patient is interested in trying an additional corticosteroid injection to her right hip in order to determine whether source of her pain is hip or back.  4. Ordered fluoroscopic guided intra-articular right corticosteroid hip injection.  5. Obesity--needs to continue working on diet, calorie restriction, portion control as well as non to low impact exercise for continued weight loss.  6. Tobacco abuse--patient has continued to smoke but has cut down.  She understands that if she wants to be considered for a POLLO in the future she will need to be completely nicotine free.  She will discuss tobacco cessation with her PCP.  7. Recommend OTC pain medication as needed.   8. Would recommend that she has an appointment with her PCP to determine if she needs to see a spine surgeon/neurosurgeon for further evaluation of her back issues.  Patient reports in the past all she has seen is pain management and has been provided injections.  9. Follow up 4 weeks after her fluoroscopic injection.  This appointment needs to be on a Monday or Wednesday when Dr. Acevedo is also in clinic.    10. Questions and concerns answered.      Kinjal Hartley PA-C  01/30/23  21:08 EST      Dictated Utilizing Dragon Dictation.

## 2023-02-17 ENCOUNTER — HOSPITAL ENCOUNTER (OUTPATIENT)
Dept: GENERAL RADIOLOGY | Facility: HOSPITAL | Age: 53
Discharge: HOME | End: 2023-02-17
Admitting: PHYSICIAN ASSISTANT
Payer: MEDICARE

## 2023-02-17 DIAGNOSIS — M25.551 RIGHT HIP PAIN: ICD-10-CM

## 2023-02-17 DIAGNOSIS — M16.11 ARTHRITIS OF RIGHT HIP: ICD-10-CM

## 2023-02-17 PROCEDURE — 25010000002 IOPAMIDOL 61 % SOLUTION: Performed by: PHYSICIAN ASSISTANT

## 2023-02-17 PROCEDURE — 25010000002 TRIAMCINOLONE PER 10 MG: Performed by: PHYSICIAN ASSISTANT

## 2023-02-17 PROCEDURE — 77002 NEEDLE LOCALIZATION BY XRAY: CPT

## 2023-02-17 RX ORDER — LIDOCAINE HYDROCHLORIDE 10 MG/ML
5 INJECTION, SOLUTION EPIDURAL; INFILTRATION; INTRACAUDAL; PERINEURAL ONCE
Status: COMPLETED | OUTPATIENT
Start: 2023-02-17 | End: 2023-02-17

## 2023-02-17 RX ORDER — BUPIVACAINE HYDROCHLORIDE 2.5 MG/ML
3 INJECTION, SOLUTION EPIDURAL; INFILTRATION; INTRACAUDAL ONCE
Status: COMPLETED | OUTPATIENT
Start: 2023-02-17 | End: 2023-02-17

## 2023-02-17 RX ORDER — LIDOCAINE HYDROCHLORIDE 10 MG/ML
3 INJECTION, SOLUTION EPIDURAL; INFILTRATION; INTRACAUDAL; PERINEURAL ONCE
Status: COMPLETED | OUTPATIENT
Start: 2023-02-17 | End: 2023-02-17

## 2023-02-17 RX ORDER — TRIAMCINOLONE ACETONIDE 40 MG/ML
80 INJECTION, SUSPENSION INTRA-ARTICULAR; INTRAMUSCULAR ONCE
Status: COMPLETED | OUTPATIENT
Start: 2023-02-17 | End: 2023-02-17

## 2023-02-17 RX ADMIN — IOPAMIDOL 10 ML: 612 INJECTION, SOLUTION INTRAVENOUS at 14:38

## 2023-02-17 RX ADMIN — LIDOCAINE HYDROCHLORIDE 3 ML: 10 INJECTION, SOLUTION EPIDURAL; INFILTRATION; INTRACAUDAL; PERINEURAL at 14:38

## 2023-02-17 RX ADMIN — LIDOCAINE HYDROCHLORIDE 5 ML: 10 INJECTION, SOLUTION EPIDURAL; INFILTRATION; INTRACAUDAL; PERINEURAL at 14:38

## 2023-02-17 RX ADMIN — TRIAMCINOLONE ACETONIDE 80 MG: 40 INJECTION, SUSPENSION INTRA-ARTICULAR; INTRAMUSCULAR at 14:38

## 2023-02-17 RX ADMIN — BUPIVACAINE HYDROCHLORIDE 3 ML: 2.5 INJECTION, SOLUTION EPIDURAL; INFILTRATION; INTRACAUDAL; PERINEURAL at 14:37

## 2023-02-22 PROCEDURE — 87205 SMEAR GRAM STAIN: CPT | Performed by: PHYSICIAN ASSISTANT

## 2023-02-22 PROCEDURE — 87070 CULTURE OTHR SPECIMN AEROBIC: CPT | Performed by: PHYSICIAN ASSISTANT

## 2023-03-20 ENCOUNTER — OFFICE VISIT (OUTPATIENT)
Dept: NEUROLOGY | Facility: CLINIC | Age: 53
End: 2023-03-20
Payer: MEDICARE

## 2023-03-20 VITALS
WEIGHT: 227.2 LBS | HEART RATE: 83 BPM | TEMPERATURE: 96.8 F | OXYGEN SATURATION: 100 % | DIASTOLIC BLOOD PRESSURE: 60 MMHG | BODY MASS INDEX: 38.79 KG/M2 | HEIGHT: 64 IN | SYSTOLIC BLOOD PRESSURE: 106 MMHG

## 2023-03-20 DIAGNOSIS — G43.C0 PERIODIC HEADACHE SYNDROME, NOT INTRACTABLE: ICD-10-CM

## 2023-03-20 DIAGNOSIS — G47.33 OBSTRUCTIVE SLEEP APNEA: Primary | ICD-10-CM

## 2023-03-20 PROBLEM — G93.2 IIH (IDIOPATHIC INTRACRANIAL HYPERTENSION): Chronic | Status: ACTIVE | Noted: 2020-08-11

## 2023-03-20 PROBLEM — G93.2 IIH (IDIOPATHIC INTRACRANIAL HYPERTENSION): Chronic | Status: RESOLVED | Noted: 2020-08-11 | Resolved: 2023-03-20

## 2023-03-20 PROCEDURE — 99214 OFFICE O/P EST MOD 30 MIN: CPT | Performed by: PSYCHIATRY & NEUROLOGY

## 2023-03-20 PROCEDURE — 1160F RVW MEDS BY RX/DR IN RCRD: CPT | Performed by: PSYCHIATRY & NEUROLOGY

## 2023-03-20 PROCEDURE — 1159F MED LIST DOCD IN RCRD: CPT | Performed by: PSYCHIATRY & NEUROLOGY

## 2023-03-20 NOTE — ASSESSMENT & PLAN NOTE
Headaches are worsening.  Medication changes per orders.     Stop Topamax     New Emgality start loaded in office     Nurtec samples

## 2023-03-20 NOTE — PROGRESS NOTES
"Chief Complaint  Migraine, vision changes, and History of fall    Subjective        Kelsi Costa presents to [facility]     History of Present Illness     52 y.o. female returns in follow up.  Last visit on 1/8/21 continued TPM.    MRI Brain/MRV, my review of films, 8/20/20 and 1/15/21 empty sella vs pituitary cyst     LP 1/15/21 opening pressure 20.5 cm H20 CSF benign     HA frequency is 3 - 4 days a week.  Located in back of head.   Quality is tension.  Moderate intensity.    Assoc sx of dizziness, visual changes.     Preventatives:  Elavil, TPM, GBP, Effexor, Cymbalta,    Objective   Vital Signs:  /60   Pulse 83   Temp 96.8 °F (36 °C)   Ht 162.6 cm (64.02\")   Wt 103 kg (227 lb 3.2 oz)   SpO2 100%   BMI 38.98 kg/m²   Estimated body mass index is 38.98 kg/m² as calculated from the following:    Height as of this encounter: 162.6 cm (64.02\").    Weight as of this encounter: 103 kg (227 lb 3.2 oz).             Physical Exam  Eyes:      Extraocular Movements: EOM normal.      Pupils: Pupils are equal, round, and reactive to light.   Neurological:      Mental Status: She is oriented to person, place, and time.      Cranial Nerves: Cranial nerves 2-12 are intact.      Motor: Motor strength is normal.      Gait: Gait is intact.   Psychiatric:         Speech: Speech normal.          Neurologic Exam     Mental Status   Oriented to person, place, and time.   Speech: speech is normal   Level of consciousness: alert  Knowledge: good and consistent with education.   Normal comprehension.     Cranial Nerves   Cranial nerves II through XII intact.     CN II   Visual fields full to confrontation.   Visual acuity: normal  Right visual field deficit: none  Left visual field deficit: none     CN III, IV, VI   Pupils are equal, round, and reactive to light.  Extraocular motions are normal.   Nystagmus: none   Diplopia: none  Ophthalmoparesis: none  Upgaze: normal  Downgaze: " normal  Conjugate gaze: present    CN V   Facial sensation intact.   Right corneal reflex: normal  Left corneal reflex: normal    CN VII   Right facial weakness: none  Left facial weakness: none    CN VIII   Hearing: intact    CN IX, X   Palate: symmetric  Right gag reflex: normal  Left gag reflex: normal    CN XI   Right sternocleidomastoid strength: normal  Left sternocleidomastoid strength: normal    CN XII   Tongue: not atrophic  Fasciculations: absent  Tongue deviation: none    Motor Exam   Muscle bulk: normal  Overall muscle tone: normal    Strength   Strength 5/5 throughout.     Sensory Exam   Light touch normal.     Gait, Coordination, and Reflexes     Gait  Gait: normal    Tremor   Resting tremor: absent  Intention tremor: absent  Action tremor: absent    Reflexes   Reflexes 2+ except as noted.      Result Review :  The following data was reviewed by: Raoul Nolan MD on 03/20/2023:      Data reviewed: Radiologic studies MRI/MRV head     CSF            Assessment and Plan   Diagnoses and all orders for this visit:    1. Obstructive sleep apnea (Primary)    2. Periodic headache syndrome, not intractable  Assessment & Plan:  Headaches are worsening.  Medication changes per orders.     Stop Topamax     New Emgality start loaded in office     Nurtec samples                  Follow Up   No follow-ups on file.  Patient was given instructions and counseling regarding her condition or for health maintenance advice. Please see specific information pulled into the AVS if appropriate.

## 2023-03-23 ENCOUNTER — PATIENT ROUNDING (BHMG ONLY) (OUTPATIENT)
Dept: NEUROLOGY | Facility: CLINIC | Age: 53
End: 2023-03-23
Payer: MEDICARE

## 2023-03-23 NOTE — PROGRESS NOTES
March 23, 2023    Hello, may I speak with Kelsi Costa?    My name is brian    I am  with [facility]  [address] [facility] [facility] [address]  [facility] [address] [phone].    Before we get started may I verify your date of birth? 1970    I am calling to officially welcome you to our practice and ask about your recent visit. Is this a good time to talk?   Patient rounding sent through [facility] ????.

## 2023-04-17 ENCOUNTER — TELEPHONE (OUTPATIENT)
Dept: ORTHOPEDIC SURGERY | Facility: CLINIC | Age: 53
End: 2023-04-17

## 2023-04-18 ENCOUNTER — TELEPHONE (OUTPATIENT)
Dept: ORTHOPEDIC SURGERY | Facility: CLINIC | Age: 53
End: 2023-04-18
Payer: MEDICARE

## 2023-04-18 NOTE — TELEPHONE ENCOUNTER
I spoke with the patient to let her know it is too early to schedule another hip injection. The patient verbalized understanding and will call  to time to get this scheduled.     Elsie Acevedo

## 2023-04-21 ENCOUNTER — OFFICE VISIT (OUTPATIENT)
Dept: NEUROSURGERY | Facility: CLINIC | Age: 53
End: 2023-04-21
Payer: MEDICARE

## 2023-04-21 VITALS
WEIGHT: 232.6 LBS | BODY MASS INDEX: 39.71 KG/M2 | TEMPERATURE: 96.9 F | SYSTOLIC BLOOD PRESSURE: 124 MMHG | HEIGHT: 64 IN | HEART RATE: 77 BPM | DIASTOLIC BLOOD PRESSURE: 81 MMHG

## 2023-04-21 DIAGNOSIS — G56.03 BILATERAL CARPAL TUNNEL SYNDROME: ICD-10-CM

## 2023-04-21 DIAGNOSIS — M16.9 HIP ARTHROSIS: ICD-10-CM

## 2023-04-21 DIAGNOSIS — M47.812 CERVICAL SPONDYLOSIS WITHOUT MYELOPATHY: ICD-10-CM

## 2023-04-21 DIAGNOSIS — Z71.6 TOBACCO ABUSE COUNSELING: ICD-10-CM

## 2023-04-21 DIAGNOSIS — M51.36 LUMBAR DEGENERATIVE DISC DISEASE: Primary | ICD-10-CM

## 2023-04-21 PROCEDURE — 99204 OFFICE O/P NEW MOD 45 MIN: CPT | Performed by: PHYSICIAN ASSISTANT

## 2023-04-21 PROCEDURE — 1160F RVW MEDS BY RX/DR IN RCRD: CPT | Performed by: PHYSICIAN ASSISTANT

## 2023-04-21 PROCEDURE — 1159F MED LIST DOCD IN RCRD: CPT | Performed by: PHYSICIAN ASSISTANT

## 2023-04-21 RX ORDER — TOPIRAMATE 50 MG/1
TABLET, FILM COATED ORAL
COMMUNITY
Start: 2023-04-14

## 2023-04-21 NOTE — PROGRESS NOTES
Patient: Kelsi Costa  : 1970  Chart #: [id number]    Date of Service: 2023    CHIEF COMPLAINT:   1.  Low back and right leg pain  2.  Right hip pain  3.  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  She presents today for evaluation of back, hip, and neck pain that has been ongoing for about 3 years.  She has seen Ortho for her hip.  Surgery has been discussed but she was referred to our clinic to rule out lumbar pathology prior to having her hip worked on.  Pain in the low back extends into the right hip and down the leg all the way to the shin at times.  Sometimes it is just more thigh pain.  She has groin pain.  No significant left-sided symptoms.  There does not seem to be a consistent pattern to her difficulties.  Sometimes her walking is limited.  Symptoms are worse when sitting or lying down.  She was treated with epidural injections and SI joint injections that were not helpful.  Rhizotomies have provided the most benefit.  She also gets injections in the right hip.  She uses a back brace at work.  Remotely she tried physical therapy and it made symptoms worse.  She bought a firmer bed.  She also complains of neck pain without clear-cut radicular symptoms.  When she wakes up in the morning her hands ache.  She said it feels different than a numbing pain.  This also occurs when she is driving.  She tried wrist splints years ago but does not recall this being of benefit.  She is prescribed gabapentin and Roxicodone 10 mg.  She has been working on weight loss.  She smokes tobacco products daily.  She is status post right total knee replacement and left partial.      Past Medical History:   Diagnosis Date   • Angina at rest    • Arthritis of neck    • Asthma    • Back pain    • Bursitis of hip    • Cervical disc disorder    • Diabetes mellitus    • Fibromyalgia    • Headache     • Hip arthrosis 2020   • Hyperlipidemia    • Hypertension    • Knee swelling 2015   • Low back strain 2005   • Lumbosacral disc disease 2005   • Mitral valve prolapse    • Tear of meniscus of knee 1999   • Tendinitis of knee 1999   • Vertigo          Current Outpatient Medications:   •  amitriptyline (ELAVIL) 10 MG tablet, Take 1 tablet by mouth Every Night., Disp: , Rfl:   •  atorvastatin (LIPITOR) 80 MG tablet, TK 1 T PO HS, Disp: , Rfl:   •  azelastine (ASTELIN) 0.1 % nasal spray, INSTILL 1 SPRAY INTO EACH NOSTRIL EVERY DAY TO FOUR TIMES DAILY, Disp: , Rfl:   •  budesonide (RINOCORT AQUA) 32 MCG/ACT nasal spray, 1 spray into the nostril(s) as directed by provider 2 (Two) Times a Day., Disp: , Rfl:   •  diltiaZEM (CARDIZEM) 60 MG tablet, Take 1 tablet by mouth Daily., Disp: , Rfl:   •  estradiol (ESTRACE) 1 MG tablet, Take 1 tablet by mouth Daily., Disp: , Rfl:   •  fluticasone (FLONASE) 50 MCG/ACT nasal spray, 2 sprays into the nostril(s) as directed by provider Daily., Disp: , Rfl:   •  gabapentin (NEURONTIN) 800 MG tablet, Take 1 tablet by mouth 2 (two) times a day., Disp: , Rfl:   •  levocetirizine (XYZAL) 5 MG tablet, Take 1 tablet by mouth Every Evening., Disp: , Rfl:   •  losartan-hydrochlorothiazide (HYZAAR) 100-25 MG per tablet, Take 1 tablet by mouth Daily., Disp: , Rfl:   •  metFORMIN (GLUCOPHAGE) 1000 MG tablet, Take 500 mg by mouth 2 (Two) Times a Day With Meals., Disp: , Rfl:   •  methocarbamol (ROBAXIN) 500 MG tablet, , Disp: , Rfl:   •  Movantik 25 MG tablet, TAKE 1/2 TABLET BY MOUTH EVERY DAY FOR CONSTIPATION, Disp: , Rfl:   •  nitroglycerin (NITROSTAT) 0.4 MG SL tablet, Place 1 tablet under the tongue Every 5 (Five) Minutes As Needed for Chest Pain. Take no more than 3 doses in 15 minutes., Disp: 90 tablet, Rfl: 0  •  ondansetron ODT (ZOFRAN-ODT) 4 MG disintegrating tablet, , Disp: , Rfl:   •  oxyCODONE (ROXICODONE) 10 MG tablet, Take 1 tablet by mouth Every 8 (Eight) Hours As Needed for  Moderate Pain., Disp: , Rfl:   •  Ozempic, 0.25 or 0.5 MG/DOSE, 2 MG/1.5ML solution pen-injector, , Disp: , Rfl:   •  potassium chloride 10 MEQ CR tablet, Take 1 tablet by mouth Daily., Disp: , Rfl:   •  ranolazine (RANEXA) 500 MG 12 hr tablet, , Disp: , Rfl:   •  topiramate (TOPAMAX) 50 MG tablet, , Disp: , Rfl:   •  Ventolin  (90 Base) MCG/ACT inhaler, Inhale See Admin Instructions. Inhale 2 puffs by mouth every 4 to 6 hours as needed, Disp: , Rfl:     Past Surgical History:   Procedure Laterality Date   • CARDIAC CATHETERIZATION N/A 2017    Procedure: Left Heart Cath;  Surgeon: Darnell Allen MD;  Location: [facility] INVASIVE LOCATION;  Service:    •  SECTION     • ESSURE TUBAL LIGATION     • FEMORAL ARTERY REPAIR     • HYSTERECTOMY     • JOINT REPLACEMENT  L knee  R knee ?    • KNEE ARTHROPLASTY, PARTIAL REPLACEMENT     • TOTAL KNEE ARTHROPLASTY Right    • TRIGGER POINT INJECTION  May 2022 in R hip       Social History     Socioeconomic History   • Marital status:    Tobacco Use   • Smoking status: Every Day     Types: Cigars     Start date: 2017   • Smokeless tobacco: Never   Vaping Use   • Vaping Use: Never used   Substance and Sexual Activity   • Alcohol use: Not Currently     Alcohol/week: 2.0 standard drinks     Types: 2 Glasses of wine per week     Comment: Occasionally   • Drug use: No   • Sexual activity: Defer         Review of Systems   Constitutional: Positive for activity change and fatigue. Negative for appetite change, chills, diaphoresis, fever and unexpected weight change.   HENT: Positive for ear discharge, ear pain, postnasal drip, sinus pressure and sneezing. Negative for congestion, dental problem, drooling, facial swelling, hearing loss, mouth sores, nosebleeds, rhinorrhea, sinus pain, sore throat, tinnitus, trouble swallowing and voice change.    Eyes: Positive for pain, discharge, redness, itching and visual disturbance. Negative for  "photophobia.   Respiratory: Positive for apnea, chest tightness and shortness of breath. Negative for cough, choking, wheezing and stridor.    Cardiovascular: Positive for palpitations and leg swelling. Negative for chest pain.   Gastrointestinal: Positive for constipation. Negative for abdominal distention, abdominal pain, anal bleeding, blood in stool, diarrhea, nausea, rectal pain and vomiting.   Endocrine: Positive for cold intolerance. Negative for heat intolerance, polydipsia, polyphagia and polyuria.   Genitourinary: Negative for decreased urine volume, difficulty urinating, dysuria, enuresis, flank pain, frequency, genital sores, hematuria and urgency.   Musculoskeletal: Positive for back pain, joint swelling, neck pain and neck stiffness. Negative for arthralgias, gait problem and myalgias.   Skin: Negative for color change, pallor, rash and wound.   Allergic/Immunologic: Positive for environmental allergies. Negative for food allergies and immunocompromised state.   Neurological: Positive for dizziness, weakness, light-headedness, numbness and headaches. Negative for tremors, seizures, syncope, facial asymmetry and speech difficulty.   Hematological: Negative for adenopathy. Does not bruise/bleed easily.   Psychiatric/Behavioral: Positive for sleep disturbance. Negative for agitation, behavioral problems, confusion, decreased concentration, dysphoric mood, hallucinations, self-injury and suicidal ideas. The patient is nervous/anxious. The patient is not hyperactive.    All other systems reviewed and are negative.      Objective   Vital Signs: Blood pressure 124/81, pulse 77, temperature 96.9 °F (36.1 °C), height 162.6 cm (64\"), weight 106 kg (232 lb 9.6 oz).  Physical Exam  Vitals and nursing note reviewed.   Constitutional:       General: She is not in acute distress.     Appearance: She is well-developed.   HENT:      Head: Normocephalic and atraumatic.   Cardiovascular:      Heart sounds: Normal heart " sounds.   Pulmonary:      Breath sounds: Normal breath sounds.   Psychiatric:         Behavior: Behavior normal.         Thought Content: Thought content normal.     Musculoskeletal:     Strength is intact in upper and lower extremities to direct testing.     Station and gait are normal.     Straight leg raising is negative.  Positive Danny sign worse on the right  Neurologic:     Muscle tone is normal throughout.     Coordination is intact.     Deep tendon reflexes: Diminished in the lower extremities     Sensation is intact to light touch throughout.     Patient is oriented to person, place, and time.     Iada sign negative.  No ankle clonus       Independent review of radiographic imaging: Plain films of the lumbar spine dated 2/14/2023 demonstrate normal vertebral alignment.  Mild degenerative changes noted throughout.  Plain films of the cervical spine demonstrate some spondylosis.  No evidence of fracture.    Assessment & Plan   Diagnosis:  1.  Right hip arthrosis  2.  Symptoms consistent with L4 radiculopathy  3.  Mechanical neck and back pain  4.  Possible carpal tunnel syndrome  5.  Obesity  6.  Tobacco abuse      Medical Decision Making: I am going to try to get MRI of the cervical and lumbar spine for further evaluation.  I feel that the lumbar spine takes precedence at this time as it seems to be more more of her pain is coming from.  We may consider electrodiagnostic studies.  Asked her to try wearing her wrist splints again at nighttime.  Counseled her on the importance of weight loss and smoking cessation as it pertains to her spine and surgical candidacy.  She was very understanding.  Follow-up with me to review imaging and further recommendations will be made at that time    Diagnoses and all orders for this visit:    1. Lumbar degenerative disc disease (Primary)  -     MRI Lumbar Spine Without Contrast; Future  -     MRI Cervical Spine Without Contrast; Future    2. Hip arthrosis    3.  Cervical spondylosis without myelopathy  -     MRI Lumbar Spine Without Contrast; Future  -     MRI Cervical Spine Without Contrast; Future    4. Bilateral carpal tunnel syndrome    5. BMI 39.0-39.9,adult    6. Tobacco abuse counseling                        Class 2 Severe Obesity (BMI >=35 and <=39.9). Obesity-related health conditions include the following: hypertension, diabetes mellitus, dyslipidemias and osteoarthritis. Obesity is unchanged. BMI is is above average; BMI management plan is completed. We discussed portion control and increasing exercise.         Gabriela Dyer PA-C  Patient Care Team:  Blank Mckeon APRN as PCP - General (Nurse Practitioner)

## 2023-05-12 RX ORDER — TOPIRAMATE 50 MG/1
TABLET, FILM COATED ORAL
Qty: 75 TABLET | Refills: 2 | OUTPATIENT
Start: 2023-05-12

## 2023-05-12 NOTE — TELEPHONE ENCOUNTER
Rx Refill Note  Requested Prescriptions     Pending Prescriptions Disp Refills   • topiramate (TOPAMAX) 50 MG tablet [Pharmacy Med Name: TOPIRAMATE 50 MG TABS 50 Tablet] 75 tablet 2     Sig: TAKE 1 TABLET BY MOUTH ONCE DAILY IN THE MORNING AND 1 AND 1/2 TABLETS BY MOUTH ONCE NIGHTLY AT BEDTIME      Last filled:  Last office visit with prescribing clinician: Visit date not found      Next office visit with prescribing clinician: Visit date not found     Pratima Shelton MA  05/12/23, 13:47 EDT

## 2023-05-31 ENCOUNTER — OFFICE VISIT (OUTPATIENT)
Dept: NEUROSURGERY | Facility: CLINIC | Age: 53
End: 2023-05-31

## 2023-05-31 VITALS
DIASTOLIC BLOOD PRESSURE: 78 MMHG | SYSTOLIC BLOOD PRESSURE: 112 MMHG | HEIGHT: 64 IN | BODY MASS INDEX: 39.13 KG/M2 | WEIGHT: 229.2 LBS | TEMPERATURE: 96.9 F

## 2023-05-31 DIAGNOSIS — G56.03 BILATERAL CARPAL TUNNEL SYNDROME: ICD-10-CM

## 2023-05-31 DIAGNOSIS — M51.36 LUMBAR DEGENERATIVE DISC DISEASE: ICD-10-CM

## 2023-05-31 DIAGNOSIS — M16.9 HIP ARTHROSIS: ICD-10-CM

## 2023-05-31 DIAGNOSIS — M47.812 CERVICAL SPONDYLOSIS WITHOUT MYELOPATHY: Primary | ICD-10-CM

## 2023-05-31 PROCEDURE — 99214 OFFICE O/P EST MOD 30 MIN: CPT | Performed by: PHYSICIAN ASSISTANT

## 2023-05-31 PROCEDURE — 1160F RVW MEDS BY RX/DR IN RCRD: CPT | Performed by: PHYSICIAN ASSISTANT

## 2023-05-31 PROCEDURE — 1159F MED LIST DOCD IN RCRD: CPT | Performed by: PHYSICIAN ASSISTANT

## 2023-05-31 RX ORDER — LIDOCAINE 50 MG/G
1 PATCH TOPICAL EVERY 24 HOURS
Qty: 30 PATCH | Refills: 0 | COMMUNITY
Start: 2023-05-22 | End: 2023-06-21

## 2023-05-31 RX ORDER — CYCLOBENZAPRINE HCL 10 MG
10 TABLET ORAL
COMMUNITY
Start: 2023-05-22 | End: 2023-06-01
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
## 2023-07-11 ENCOUNTER — TELEPHONE (OUTPATIENT)
Dept: ORTHOPEDIC SURGERY | Facility: CLINIC | Age: 53
End: 2023-07-11

## 2023-07-11 NOTE — TELEPHONE ENCOUNTER
Caller: Kelsi Costa    Relationship to patient: Self    Best call back number: [phone] (home)     Chief complaint: RIGHT HIP PAIN    Type of visit: INJECTION - HOSPITAL PERFORMED    Requested date: ASAP

## 2023-07-20 ENCOUNTER — OFFICE VISIT (OUTPATIENT)
Dept: ORTHOPEDIC SURGERY | Facility: CLINIC | Age: 53
End: 2023-07-20
Payer: MEDICARE

## 2023-07-20 VITALS
HEIGHT: 64 IN | SYSTOLIC BLOOD PRESSURE: 126 MMHG | BODY MASS INDEX: 37.05 KG/M2 | WEIGHT: 217 LBS | DIASTOLIC BLOOD PRESSURE: 86 MMHG

## 2023-07-20 DIAGNOSIS — Z72.0 TOBACCO ABUSE: ICD-10-CM

## 2023-07-20 DIAGNOSIS — M51.36 DEGENERATIVE DISC DISEASE, LUMBAR: ICD-10-CM

## 2023-07-20 DIAGNOSIS — M16.11 ARTHRITIS OF RIGHT HIP: ICD-10-CM

## 2023-07-20 DIAGNOSIS — M25.551 RIGHT HIP PAIN: Primary | ICD-10-CM

## 2023-07-20 DIAGNOSIS — E66.09 CLASS 2 OBESITY DUE TO EXCESS CALORIES WITHOUT SERIOUS COMORBIDITY WITH BODY MASS INDEX (BMI) OF 37.0 TO 37.9 IN ADULT: ICD-10-CM

## 2023-07-20 RX ORDER — LANOLIN ALCOHOL/MO/W.PET/CERES
1000 CREAM (GRAM) TOPICAL
COMMUNITY
Start: 2023-06-19

## 2023-07-20 RX ORDER — SEMAGLUTIDE 1.34 MG/ML
1 INJECTION, SOLUTION SUBCUTANEOUS
COMMUNITY
Start: 2023-06-15

## 2023-07-20 NOTE — PROGRESS NOTES
[facility] Note        Subjective     CC: Follow-up (6 months follow up - Right hip pain)      HPI    Kelsi Costa is a 53 y.o. female.  Patient returns for follow-up evaluation of her right hip.  She states that she is slowly worsening with regards to pain.  Prior treatments have included intra-articular hip injection which helped for about a week before symptoms returned.  She also had her back evaluated and was told if she gets her hip fixed it would help with regards to her ongoing lumbar spine issues (lumbar degenerative disc disease with evidence of L4 radiculopathy).    Pain scale:6/10.  Patient does use a cane to assist with ambulation.  She has been working on weight loss.  Associated symptoms giving away.  Pain is worse with walking, standing, stair climbing, sleeping, movement of joint, rising from a seated position.  At this time nothing eases her hip pain.    Does have a history of angina and is under the care of Dr. Allen.  Positive tobacco use but states she quit as of 2 days ago.  With regards to weight loss her BMI has decreased from 39-37 since I last saw her.    Overall, patient's symptoms are unchanged, worsening due to increasing pain to hip.    ROS:    Constiutional:Pt denies fever, chills, nausea, or vomiting.  MSK:as above        Objective      Past Medical History  Past Medical History:   Diagnosis Date    Angina at rest     Arthritis of neck 2020    Asthma     Back pain     Bursitis of hip 2020    Cervical disc disorder 2020    Diabetes mellitus     Fibromyalgia     Headache     Hip arthrosis 2020    Hyperlipidemia     Hypertension     Knee swelling 2015    Low back strain 2005    Lumbosacral disc disease 2005    Mitral valve prolapse     Tear of meniscus of knee 1999    Tendinitis of knee 1999    Vertigo      Social History     Socioeconomic History    Marital status:    Tobacco Use    Smoking status: Every Day     Types: Cigars     Start  "date: 6/16/2017    Smokeless tobacco: Never   Vaping Use    Vaping Use: Never used   Substance and Sexual Activity    Alcohol use: Not Currently     Alcohol/week: 2.0 standard drinks     Types: 2 Glasses of wine per week     Comment: Occasionally    Drug use: No    Sexual activity: Defer          Physical Exam  /86   Ht 162.6 cm (64\")   Wt 98.4 kg (217 lb)   BMI 37.25 kg/m²     Body mass index is 37.25 kg/m².    Patient is well nourished and well developed.        Ortho Exam  Neurologic:   Sensation:    Right foot: Intact to light touch on the dorsal and plantar aspect   Motor:  Right lower extremity: 5/5 quadriceps, hamstrings, ankle dorsiflexors, and ankle plantar flexors    Lower Extremity:   Right hip:    Tenderness:  Groin    Swelling:  None    Crepitus:  None    Atrophy:  None    Range of motion:  External Rotation: 20°       Internal Rotation: 20°       Flexion:  110°       Extension:  0°   Instability:  None  Deformities:  None  Functional testing: Positive Stinchfield; Positive passive ER/IR of hip    No leg length discrepancy      Imaging/Labs/EMG Reviewed:  No new imaging today.      Assessment:  1. Right hip pain    2. Arthritis of right hip    3. Degenerative disc disease, lumbar    4. Tobacco abuse    5. Class 2 obesity due to excess calories without serious comorbidity with body mass index (BMI) of 37.0 to 37.9 in adult        Plan:  Right hip pain due to arthritis and DDD lumbar spine--patient has been evaluated by neurosurgery and it is believed that proceeding with POLLO of her right hip will also help her lumbar spine issues.  Tobacco abuse--patient quit smoking 2 days ago.  She understands in order to proceed with POLLO she will need to undergo nicotine screen.  This will be done at her follow-up appointment in 4 weeks.  Obesity--BMI 37 which is down from 39 at her last appointment with me in January 2023.  She will continue working with diet, calorie restriction and portion control as well " as non to low impact exercise.  Patient does have a history of angina and is followed by Dr. Allen--she will need to obtain cardiac clearance prior to proceeding with POLLO.  She can start working on that now.  Recommend OTC NSAIDS/pain medication as needed.  Follow up with Dr. Lewis in 4 weeks to discuss scheduling POLLO.  Patient will need templating imaging at that appointment.  Understands that if her nicotine screen that will be completed at that appointment comes back positive then she cannot proceed with POLLO.  Also understands that if her weight increases greater than BMI 40 she cannot proceed with POLLO.  Questions and concerns answered.      Kinjal Hartley PA-C  07/26/23  14:48 EDT      Dictated Utilizing Dragon Dictation.

## 2023-07-25 ENCOUNTER — TELEPHONE (OUTPATIENT)
Dept: ORTHOPEDIC SURGERY | Facility: CLINIC | Age: 53
End: 2023-07-25

## 2023-07-25 NOTE — TELEPHONE ENCOUNTER
PLEASE CALL PATIENT's CARDIOLOGIST DR ARELLANO's [facility] ---[phone] TO FIND OUT WHAT INFORMATION THEY NEED FROM DR HANDY IN ORDER FOR DR [facility]LLANO[facility] TO COMPLETE / SEND A CARDIAC CLEARANCE BEFORE PATIENT's RIGHT TOTAL HIP REPLACEMENT TO BE SCHEDULED     CARDIO DR [facility]LLANO[facility] TOLD PATIENT THEY NEED TO KNOW WHAT DR HANDY's [facility] NEEDS TO HAVE SENT OVER FOR A SURGERY CLEARANCE    ALSO FYI: DR ARELLANO's [facility] --[phone]     THANKS

## 2023-08-17 ENCOUNTER — LAB (OUTPATIENT)
Dept: LAB | Facility: HOSPITAL | Age: 53
End: 2023-08-17
Payer: MEDICARE

## 2023-08-17 ENCOUNTER — OFFICE VISIT (OUTPATIENT)
Dept: ORTHOPEDIC SURGERY | Facility: CLINIC | Age: 53
End: 2023-08-17
Payer: MEDICARE

## 2023-08-17 VITALS
WEIGHT: 221 LBS | DIASTOLIC BLOOD PRESSURE: 86 MMHG | HEIGHT: 64 IN | BODY MASS INDEX: 37.73 KG/M2 | SYSTOLIC BLOOD PRESSURE: 126 MMHG

## 2023-08-17 DIAGNOSIS — F17.201 TOBACCO ABUSE, IN REMISSION: ICD-10-CM

## 2023-08-17 DIAGNOSIS — M16.11 PRIMARY OSTEOARTHRITIS OF RIGHT HIP: Primary | ICD-10-CM

## 2023-08-17 DIAGNOSIS — M25.551 RIGHT HIP PAIN: ICD-10-CM

## 2023-08-17 PROBLEM — M16.10 HIP ARTHRITIS: Status: ACTIVE | Noted: 2023-08-17

## 2023-08-17 PROCEDURE — 1160F RVW MEDS BY RX/DR IN RCRD: CPT | Performed by: ORTHOPAEDIC SURGERY

## 2023-08-17 PROCEDURE — 1159F MED LIST DOCD IN RCRD: CPT | Performed by: ORTHOPAEDIC SURGERY

## 2023-08-17 PROCEDURE — 99214 OFFICE O/P EST MOD 30 MIN: CPT | Performed by: ORTHOPAEDIC SURGERY

## 2023-08-17 PROCEDURE — G0480 DRUG TEST DEF 1-7 CLASSES: HCPCS

## 2023-08-17 RX ORDER — FLUCONAZOLE 150 MG/1
150 TABLET ORAL
COMMUNITY
Start: 2023-07-17

## 2023-08-17 RX ORDER — CEFDINIR 300 MG/1
300 CAPSULE ORAL
COMMUNITY
Start: 2023-07-17

## 2023-08-17 RX ORDER — MELOXICAM 7.5 MG/1
15 TABLET ORAL ONCE
OUTPATIENT
Start: 2023-08-17 | End: 2023-08-17

## 2023-08-17 RX ORDER — CHLORHEXIDINE GLUCONATE 4 G/100ML
SOLUTION TOPICAL DAILY PRN
Qty: 236 ML | Refills: 0 | Status: SHIPPED | OUTPATIENT
Start: 2023-08-17

## 2023-08-17 RX ORDER — ACETAMINOPHEN 325 MG/1
1000 TABLET ORAL ONCE
OUTPATIENT
Start: 2023-08-17 | End: 2023-08-17

## 2023-08-17 RX ORDER — PREGABALIN 75 MG/1
75 CAPSULE ORAL ONCE
OUTPATIENT
Start: 2023-08-17 | End: 2023-08-17

## 2023-08-17 NOTE — PROGRESS NOTES
Orthopaedic Clinic Note: Hip Established Patient    Chief Complaint   Patient presents with    Follow-up     4 week follow up--Right hip pain        HPI    It has been 4  week(s) since Ms. Costa's last visit. She returns to clinic today for follow-up right hip osteoarthritis.  Patient has known arthritis of the right hip that has been treated conservatively with prescription anti-inflammatories as well as intra-articular injection.  The injection provided back in February gave her several weeks of relief before her pain returned.  She was states she received roughly 90% relief for those weeks before the pain returned.  She localizes the pain to the groin.  Rates it a 6/10 on the pain scale.  She is recently stopped smoking and has had no nicotine for at least 4-1/2 weeks.  She is ready to discuss proceeding to surgical intervention for total hip arthroplasty.    Past Medical History:   Diagnosis Date    Angina at rest     Arthritis of neck     Asthma     Back pain     Bursitis of hip     Cervical disc disorder     Diabetes mellitus     Fibromyalgia     Headache     Hip arthrosis     Hyperlipidemia     Hypertension     Knee swelling 2015    Low back strain     Lumbosacral disc disease     Mitral valve prolapse     Tear of meniscus of knee     Tendinitis of knee     Vertigo       Past Surgical History:   Procedure Laterality Date    CARDIAC CATHETERIZATION N/A 2017    Procedure: Left Heart Cath;  Surgeon: Darnell Allen MD;  Location: [facility] CATH INVASIVE LOCATION;  Service:      SECTION      ESSURE TUBAL LIGATION      FEMORAL ARTERY REPAIR      HYSTERECTOMY      JOINT REPLACEMENT  L knee  R knee ? 2008    KNEE ARTHROPLASTY, PARTIAL REPLACEMENT      TOTAL KNEE ARTHROPLASTY Right     TRIGGER POINT INJECTION  May 2022 in R hip      Family History   Problem Relation Age of Onset    Asthma Mother     Arthritis Mother     Anesthesia problems Mother     Cancer  Mother     Hypertension Father     Arthritis Father     Diabetes Father     Heart disease Brother     Scoliosis Brother     Diabetes Maternal Grandmother     Osteoporosis Maternal Grandmother      Social History     Socioeconomic History    Marital status:    Tobacco Use    Smoking status: Every Day     Types: Cigars     Start date: 6/16/2017    Smokeless tobacco: Never   Vaping Use    Vaping Use: Never used   Substance and Sexual Activity    Alcohol use: Not Currently     Alcohol/week: 2.0 standard drinks     Types: 2 Glasses of wine per week     Comment: Occasionally    Drug use: No    Sexual activity: Defer      Current Outpatient Medications on File Prior to Visit   Medication Sig Dispense Refill    amitriptyline (ELAVIL) 10 MG tablet Take 1 tablet by mouth Every Night.      atorvastatin (LIPITOR) 80 MG tablet TK 1 T PO HS      azelastine (ASTELIN) 0.1 % nasal spray INSTILL 1 SPRAY INTO EACH NOSTRIL EVERY DAY TO FOUR TIMES DAILY      budesonide (RINOCORT AQUA) 32 MCG/ACT nasal spray 1 spray into the nostril(s) as directed by provider 2 (Two) Times a Day.      cefdinir (OMNICEF) 300 MG capsule 1 capsule.      diclofenac (VOLTAREN) 50 MG EC tablet 2 (Two) Times a Day.      diltiaZEM (CARDIZEM) 60 MG tablet Take 1 tablet by mouth Daily.      estradiol (ESTRACE) 1 MG tablet Take 1 tablet by mouth Daily.      fluconazole (DIFLUCAN) 150 MG tablet 1 tablet.      fluticasone (FLONASE) 50 MCG/ACT nasal spray 2 sprays into the nostril(s) as directed by provider Daily.      gabapentin (NEURONTIN) 800 MG tablet Take 1 tablet by mouth 2 (two) times a day.      levocetirizine (XYZAL) 5 MG tablet Take 1 tablet by mouth Every Evening.      losartan-hydrochlorothiazide (HYZAAR) 100-25 MG per tablet Take 1 tablet by mouth Daily.      metFORMIN (GLUCOPHAGE) 500 MG tablet Take 1 tablet by mouth 2 (Two) Times a Day With Meals.      methocarbamol (ROBAXIN) 500 MG tablet       Movantik 25 MG tablet TAKE 1/2 TABLET BY MOUTH  "EVERY DAY FOR CONSTIPATION      nitroglycerin (NITROSTAT) 0.4 MG SL tablet Place 1 tablet under the tongue Every 5 (Five) Minutes As Needed for Chest Pain. Take no more than 3 doses in 15 minutes. 90 tablet 0    ondansetron ODT (ZOFRAN-ODT) 4 MG disintegrating tablet As Needed.      oxyCODONE (ROXICODONE) 10 MG tablet Take 1 tablet by mouth Every 8 (Eight) Hours As Needed for Moderate Pain.      Ozempic, 1 MG/DOSE, 4 MG/3ML solution pen-injector 1 mg.      potassium chloride 10 MEQ CR tablet Take 1 tablet by mouth Daily.      ranolazine (RANEXA) 500 MG 12 hr tablet       topiramate (TOPAMAX) 50 MG tablet       Ventolin  (90 Base) MCG/ACT inhaler Inhale See Admin Instructions. Inhale 2 puffs by mouth every 4 to 6 hours as needed      vitamin B-12 (CYANOCOBALAMIN) 1000 MCG tablet Take 1 tablet by mouth.      [DISCONTINUED] metFORMIN (GLUCOPHAGE) 1000 MG tablet Take 0.5 tablets by mouth 2 (Two) Times a Day With Meals.       No current facility-administered medications on file prior to visit.      Allergies   Allergen Reactions    Levaquin [Levofloxacin] Itching and Rash        Review of Systems   Constitutional: Negative.    HENT: Negative.     Eyes: Negative.    Respiratory: Negative.     Cardiovascular: Negative.    Gastrointestinal: Negative.    Endocrine: Negative.    Genitourinary: Negative.    Musculoskeletal:  Positive for arthralgias.   Skin: Negative.    Allergic/Immunologic: Negative.    Neurological: Negative.    Hematological: Negative.    Psychiatric/Behavioral: Negative.        The patient's Review of Systems was personally reviewed and confirmed as accurate.    Physical Exam  Blood pressure 126/86, height 162.6 cm (64\"), weight 100 kg (221 lb).    Body mass index is 37.93 kg/mý.    GENERAL APPEARANCE: awake, alert, oriented, in no acute distress and well developed, well nourished  LUNGS:  breathing nonlabored  EXTREMITIES: no clubbing, cyanosis  PERIPHERAL PULSES: palpable dorsalis pedis and " posterior tibial pulses bilaterally.    GAIT:  Antalgic            Hip Exam:  Right    RANGE OF MOTION:  EXTENSION/FLEXION:  normal (0-110 degrees)  IR (at 90 degrees of flexion):  10  ER (at 90 degrees of flexion):  35  PAIN WITH HIP MOTION:  yes, localized to groin  PAIN WITH LOGROLL:  no     STINCHFIELD TEST: positive    STRENGTH:  ABDUCTOR:  5/5  ADDUCTOR:  5/5  HIP FLEXION:  5/5    GREATER TROCHANTER BURSAL PAIN:  no    SENSATION TO LIGHT TOUCH:  DEEP PERONEAL/SUPERFICIAL PERONEAL/SURAL/SAPHENOUS/TIBIAL:   intact    EDEMA:  no  ERYTHEMA:  no  WOUNDS/INCISIONS:   no  _________________________________________________________________  _________________________________________________________________    RADIOGRAPHIC FINDINGS:   Indication: Right hip pain    Comparison: Todays xrays were compared to previous xrays from 1/27/2023    AP pelvis: Right: moderate joint space narrowing,  there are marginal osteophytes visualized at the femoral head-neck junction and acetabular margins and No significant changes compared to prior radiographs.;Left: mild joint space narrowing, there are marginal osteophytes visualized at the femoral head-neck junction and acetabular margins and No significant changes compared to prior radiographs.      Assessment/Plan:   Diagnosis Plan   1. Primary osteoarthritis of right hip  Case Request    CBC and Differential    Comprehensive metabolic panel    Protime-INR    APTT    Hemoglobin A1c    ECG 12 Lead    Nicotine & Metabolite, Quant    Tranexamic Acid 1,000 mg in sodium chloride 0.9 % 100 mL    Tranexamic Acid 1,000 mg in sodium chloride 0.9 % 100 mL    ethyl alcohol 62 % 2 each    ceFAZolin (ANCEF) 2 g in sodium chloride 0.9 % 100 mL IVPB    acetaminophen (TYLENOL) tablet 975 mg    meloxicam (MOBIC) tablet 15 mg    pregabalin (LYRICA) capsule 75 mg    Case Request      2. Right hip pain  XR Hip With or Without Pelvis 2 - 3 View Right      3. Tobacco abuse, in remission  Nicotine &  Metabolite, Ur Qn - Urine, Clean Catch        Patient has failed conservative treatment including prescription anti-inflammatories and intra-articular injections.  She is ready to proceed to surgical intervention.  We will obtain a nicotine screen today to ensure she is nicotine free.  We will schedule her for total hip arthroplasty on the right provided the nicotine screen is negative.    The patient has clinical and radiographic evidence of end-stage right hip joint degeneration. Conservative measures have been tried for 3 months or longer, but have failed to adequately treat or improve the patient's symptoms. Pain is restricting the patient's daily activities as well as quality of life. The recommendation at this time is to proceed with a right total hip arthroplasty with the goal to improve patient function and pain. The risks, benefits, potential complications, and alternatives were discussed with the patient in detail. Risks included but were not limited to bleeding, infection, anesthesia risks, damage to neurovascular structures, osteolysis, aseptic loosening, instability, dislocation, pain, continued pain, iatrogenic fracture, likely discrepancy, possible need for future surgery including the potential for amputation, blood clots, myocardial infarction, stroke, and death. Stefany-operative blood management and the potential for blood transfusion were discussed with risks and options clearly outlined. Specific details of the surgical procedure, hospitalization, recovery, rehabilitation, and long-term precautions were also presented. Pre-operative teaching was provided. Implant/prosthesis selection was outlined, and the many options available were explained; the final choice will be made at the time of the procedure to match the anatomy and condition of the bone, ligaments, tendons, and muscles. Given this instruction, the patient elected to proceed with the right total hip arthroplasty. The patient will be seen  by pre-admission testing for pre-operative optimization and risk assessment and will be scheduled for surgery once this is completed.    The patient is considered standard risk for DVT based on patient risk factors and will be placed on aspirin postoperatively for DVT prophylaxis.      Alin Lewis MD  08/17/23  09:47 EDT

## 2023-08-17 NOTE — H&P (VIEW-ONLY)
Orthopaedic Clinic Note: Hip Established Patient    Chief Complaint   Patient presents with    Follow-up     4 week follow up--Right hip pain        HPI    It has been 4  week(s) since Ms. Costa's last visit. She returns to clinic today for follow-up right hip osteoarthritis.  Patient has known arthritis of the right hip that has been treated conservatively with prescription anti-inflammatories as well as intra-articular injection.  The injection provided back in February gave her several weeks of relief before her pain returned.  She was states she received roughly 90% relief for those weeks before the pain returned.  She localizes the pain to the groin.  Rates it a 6/10 on the pain scale.  She is recently stopped smoking and has had no nicotine for at least 4-1/2 weeks.  She is ready to discuss proceeding to surgical intervention for total hip arthroplasty.    Past Medical History:   Diagnosis Date    Angina at rest     Arthritis of neck     Asthma     Back pain     Bursitis of hip     Cervical disc disorder     Diabetes mellitus     Fibromyalgia     Headache     Hip arthrosis     Hyperlipidemia     Hypertension     Knee swelling 2015    Low back strain     Lumbosacral disc disease     Mitral valve prolapse     Tear of meniscus of knee     Tendinitis of knee     Vertigo       Past Surgical History:   Procedure Laterality Date    CARDIAC CATHETERIZATION N/A 2017    Procedure: Left Heart Cath;  Surgeon: Darnell Allen MD;  Location: [facility] CATH INVASIVE LOCATION;  Service:      SECTION      ESSURE TUBAL LIGATION      FEMORAL ARTERY REPAIR      HYSTERECTOMY      JOINT REPLACEMENT  L knee  R knee ? 2008    KNEE ARTHROPLASTY, PARTIAL REPLACEMENT      TOTAL KNEE ARTHROPLASTY Right     TRIGGER POINT INJECTION  May 2022 in R hip      Family History   Problem Relation Age of Onset    Asthma Mother     Arthritis Mother     Anesthesia problems Mother     Cancer  Mother     Hypertension Father     Arthritis Father     Diabetes Father     Heart disease Brother     Scoliosis Brother     Diabetes Maternal Grandmother     Osteoporosis Maternal Grandmother      Social History     Socioeconomic History    Marital status:    Tobacco Use    Smoking status: Every Day     Types: Cigars     Start date: 6/16/2017    Smokeless tobacco: Never   Vaping Use    Vaping Use: Never used   Substance and Sexual Activity    Alcohol use: Not Currently     Alcohol/week: 2.0 standard drinks     Types: 2 Glasses of wine per week     Comment: Occasionally    Drug use: No    Sexual activity: Defer      Current Outpatient Medications on File Prior to Visit   Medication Sig Dispense Refill    amitriptyline (ELAVIL) 10 MG tablet Take 1 tablet by mouth Every Night.      atorvastatin (LIPITOR) 80 MG tablet TK 1 T PO HS      azelastine (ASTELIN) 0.1 % nasal spray INSTILL 1 SPRAY INTO EACH NOSTRIL EVERY DAY TO FOUR TIMES DAILY      budesonide (RINOCORT AQUA) 32 MCG/ACT nasal spray 1 spray into the nostril(s) as directed by provider 2 (Two) Times a Day.      cefdinir (OMNICEF) 300 MG capsule 1 capsule.      diclofenac (VOLTAREN) 50 MG EC tablet 2 (Two) Times a Day.      diltiaZEM (CARDIZEM) 60 MG tablet Take 1 tablet by mouth Daily.      estradiol (ESTRACE) 1 MG tablet Take 1 tablet by mouth Daily.      fluconazole (DIFLUCAN) 150 MG tablet 1 tablet.      fluticasone (FLONASE) 50 MCG/ACT nasal spray 2 sprays into the nostril(s) as directed by provider Daily.      gabapentin (NEURONTIN) 800 MG tablet Take 1 tablet by mouth 2 (two) times a day.      levocetirizine (XYZAL) 5 MG tablet Take 1 tablet by mouth Every Evening.      losartan-hydrochlorothiazide (HYZAAR) 100-25 MG per tablet Take 1 tablet by mouth Daily.      metFORMIN (GLUCOPHAGE) 500 MG tablet Take 1 tablet by mouth 2 (Two) Times a Day With Meals.      methocarbamol (ROBAXIN) 500 MG tablet       Movantik 25 MG tablet TAKE 1/2 TABLET BY MOUTH  "EVERY DAY FOR CONSTIPATION      nitroglycerin (NITROSTAT) 0.4 MG SL tablet Place 1 tablet under the tongue Every 5 (Five) Minutes As Needed for Chest Pain. Take no more than 3 doses in 15 minutes. 90 tablet 0    ondansetron ODT (ZOFRAN-ODT) 4 MG disintegrating tablet As Needed.      oxyCODONE (ROXICODONE) 10 MG tablet Take 1 tablet by mouth Every 8 (Eight) Hours As Needed for Moderate Pain.      Ozempic, 1 MG/DOSE, 4 MG/3ML solution pen-injector 1 mg.      potassium chloride 10 MEQ CR tablet Take 1 tablet by mouth Daily.      ranolazine (RANEXA) 500 MG 12 hr tablet       topiramate (TOPAMAX) 50 MG tablet       Ventolin  (90 Base) MCG/ACT inhaler Inhale See Admin Instructions. Inhale 2 puffs by mouth every 4 to 6 hours as needed      vitamin B-12 (CYANOCOBALAMIN) 1000 MCG tablet Take 1 tablet by mouth.      [DISCONTINUED] metFORMIN (GLUCOPHAGE) 1000 MG tablet Take 0.5 tablets by mouth 2 (Two) Times a Day With Meals.       No current facility-administered medications on file prior to visit.      Allergies   Allergen Reactions    Levaquin [Levofloxacin] Itching and Rash        Review of Systems   Constitutional: Negative.    HENT: Negative.     Eyes: Negative.    Respiratory: Negative.     Cardiovascular: Negative.    Gastrointestinal: Negative.    Endocrine: Negative.    Genitourinary: Negative.    Musculoskeletal:  Positive for arthralgias.   Skin: Negative.    Allergic/Immunologic: Negative.    Neurological: Negative.    Hematological: Negative.    Psychiatric/Behavioral: Negative.        The patient's Review of Systems was personally reviewed and confirmed as accurate.    Physical Exam  Blood pressure 126/86, height 162.6 cm (64\"), weight 100 kg (221 lb).    Body mass index is 37.93 kg/m².    GENERAL APPEARANCE: awake, alert, oriented, in no acute distress and well developed, well nourished  LUNGS:  breathing nonlabored  EXTREMITIES: no clubbing, cyanosis  PERIPHERAL PULSES: palpable dorsalis pedis and " posterior tibial pulses bilaterally.    GAIT:  Antalgic            Hip Exam:  Right    RANGE OF MOTION:  EXTENSION/FLEXION:  normal (0-110 degrees)  IR (at 90 degrees of flexion):  10  ER (at 90 degrees of flexion):  35  PAIN WITH HIP MOTION:  yes, localized to groin  PAIN WITH LOGROLL:  no     STINCHFIELD TEST: positive    STRENGTH:  ABDUCTOR:  5/5  ADDUCTOR:  5/5  HIP FLEXION:  5/5    GREATER TROCHANTER BURSAL PAIN:  no    SENSATION TO LIGHT TOUCH:  DEEP PERONEAL/SUPERFICIAL PERONEAL/SURAL/SAPHENOUS/TIBIAL:   intact    EDEMA:  no  ERYTHEMA:  no  WOUNDS/INCISIONS:   no  _________________________________________________________________  _________________________________________________________________    RADIOGRAPHIC FINDINGS:   Indication: Right hip pain    Comparison: Todays xrays were compared to previous xrays from 1/27/2023    AP pelvis: Right: moderate joint space narrowing,  there are marginal osteophytes visualized at the femoral head-neck junction and acetabular margins and No significant changes compared to prior radiographs.;Left: mild joint space narrowing, there are marginal osteophytes visualized at the femoral head-neck junction and acetabular margins and No significant changes compared to prior radiographs.      Assessment/Plan:   Diagnosis Plan   1. Primary osteoarthritis of right hip  Case Request    CBC and Differential    Comprehensive metabolic panel    Protime-INR    APTT    Hemoglobin A1c    ECG 12 Lead    Nicotine & Metabolite, Quant    Tranexamic Acid 1,000 mg in sodium chloride 0.9 % 100 mL    Tranexamic Acid 1,000 mg in sodium chloride 0.9 % 100 mL    ethyl alcohol 62 % 2 each    ceFAZolin (ANCEF) 2 g in sodium chloride 0.9 % 100 mL IVPB    acetaminophen (TYLENOL) tablet 975 mg    meloxicam (MOBIC) tablet 15 mg    pregabalin (LYRICA) capsule 75 mg    Case Request      2. Right hip pain  XR Hip With or Without Pelvis 2 - 3 View Right      3. Tobacco abuse, in remission  Nicotine &  Metabolite, Ur Qn - Urine, Clean Catch        Patient has failed conservative treatment including prescription anti-inflammatories and intra-articular injections.  She is ready to proceed to surgical intervention.  We will obtain a nicotine screen today to ensure she is nicotine free.  We will schedule her for total hip arthroplasty on the right provided the nicotine screen is negative.    The patient has clinical and radiographic evidence of end-stage right hip joint degeneration. Conservative measures have been tried for 3 months or longer, but have failed to adequately treat or improve the patient's symptoms. Pain is restricting the patient's daily activities as well as quality of life. The recommendation at this time is to proceed with a right total hip arthroplasty with the goal to improve patient function and pain. The risks, benefits, potential complications, and alternatives were discussed with the patient in detail. Risks included but were not limited to bleeding, infection, anesthesia risks, damage to neurovascular structures, osteolysis, aseptic loosening, instability, dislocation, pain, continued pain, iatrogenic fracture, likely discrepancy, possible need for future surgery including the potential for amputation, blood clots, myocardial infarction, stroke, and death. Stefany-operative blood management and the potential for blood transfusion were discussed with risks and options clearly outlined. Specific details of the surgical procedure, hospitalization, recovery, rehabilitation, and long-term precautions were also presented. Pre-operative teaching was provided. Implant/prosthesis selection was outlined, and the many options available were explained; the final choice will be made at the time of the procedure to match the anatomy and condition of the bone, ligaments, tendons, and muscles. Given this instruction, the patient elected to proceed with the right total hip arthroplasty. The patient will be seen  by pre-admission testing for pre-operative optimization and risk assessment and will be scheduled for surgery once this is completed.    The patient is considered standard risk for DVT based on patient risk factors and will be placed on aspirin postoperatively for DVT prophylaxis.      Alin Lewis MD  08/17/23  09:47 EDT

## 2023-08-27 LAB
COTININE UR-MCNC: <10 NG/ML
NICOTINE UR-MCNC: <10 NG/ML

## 2023-08-28 ENCOUNTER — PRE-ADMISSION TESTING (OUTPATIENT)
Dept: PREADMISSION TESTING | Facility: HOSPITAL | Age: 53
End: 2023-08-28
Payer: MEDICARE

## 2023-08-28 VITALS — HEIGHT: 64 IN | BODY MASS INDEX: 39.03 KG/M2 | WEIGHT: 228.62 LBS

## 2023-08-28 DIAGNOSIS — M16.11 PRIMARY OSTEOARTHRITIS OF RIGHT HIP: ICD-10-CM

## 2023-08-28 LAB
ALBUMIN SERPL-MCNC: 4.1 G/DL (ref 3.5–5.2)
ALBUMIN/GLOB SERPL: 1.2 G/DL
ALP SERPL-CCNC: 76 U/L (ref 39–117)
ALT SERPL W P-5'-P-CCNC: 15 U/L (ref 1–33)
ANION GAP SERPL CALCULATED.3IONS-SCNC: 10 MMOL/L (ref 5–15)
APTT PPP: 31 SECONDS (ref 22–39)
AST SERPL-CCNC: 21 U/L (ref 1–32)
BASOPHILS # BLD AUTO: 0.04 10*3/MM3 (ref 0–0.2)
BASOPHILS NFR BLD AUTO: 0.6 % (ref 0–1.5)
BILIRUB SERPL-MCNC: 0.3 MG/DL (ref 0–1.2)
BUN SERPL-MCNC: 6 MG/DL (ref 6–20)
BUN/CREAT SERPL: 6.1 (ref 7–25)
CALCIUM SPEC-SCNC: 9.3 MG/DL (ref 8.6–10.5)
CHLORIDE SERPL-SCNC: 97 MMOL/L (ref 98–107)
CO2 SERPL-SCNC: 31 MMOL/L (ref 22–29)
CREAT SERPL-MCNC: 0.98 MG/DL (ref 0.57–1)
DEPRECATED RDW RBC AUTO: 43.2 FL (ref 37–54)
EGFRCR SERPLBLD CKD-EPI 2021: 70 ML/MIN/1.73
EOSINOPHIL # BLD AUTO: 0.11 10*3/MM3 (ref 0–0.4)
EOSINOPHIL NFR BLD AUTO: 1.8 % (ref 0.3–6.2)
ERYTHROCYTE [DISTWIDTH] IN BLOOD BY AUTOMATED COUNT: 13.2 % (ref 12.3–15.4)
GLOBULIN UR ELPH-MCNC: 3.3 GM/DL
GLUCOSE SERPL-MCNC: 111 MG/DL (ref 65–99)
HBA1C MFR BLD: 6.2 % (ref 4.8–5.6)
HCT VFR BLD AUTO: 39.6 % (ref 34–46.6)
HGB BLD-MCNC: 12.7 G/DL (ref 12–15.9)
IMM GRANULOCYTES # BLD AUTO: 0.01 10*3/MM3 (ref 0–0.05)
IMM GRANULOCYTES NFR BLD AUTO: 0.2 % (ref 0–0.5)
INR PPP: 1 (ref 0.89–1.12)
LYMPHOCYTES # BLD AUTO: 2.66 10*3/MM3 (ref 0.7–3.1)
LYMPHOCYTES NFR BLD AUTO: 42.8 % (ref 19.6–45.3)
MCH RBC QN AUTO: 28.3 PG (ref 26.6–33)
MCHC RBC AUTO-ENTMCNC: 32.1 G/DL (ref 31.5–35.7)
MCV RBC AUTO: 88.4 FL (ref 79–97)
MONOCYTES # BLD AUTO: 0.63 10*3/MM3 (ref 0.1–0.9)
MONOCYTES NFR BLD AUTO: 10.1 % (ref 5–12)
NEUTROPHILS NFR BLD AUTO: 2.76 10*3/MM3 (ref 1.7–7)
NEUTROPHILS NFR BLD AUTO: 44.5 % (ref 42.7–76)
NRBC BLD AUTO-RTO: 0 /100 WBC (ref 0–0.2)
PLATELET # BLD AUTO: 286 10*3/MM3 (ref 140–450)
PMV BLD AUTO: 10.5 FL (ref 6–12)
POTASSIUM SERPL-SCNC: 3.3 MMOL/L (ref 3.5–5.2)
PROT SERPL-MCNC: 7.4 G/DL (ref 6–8.5)
PROTHROMBIN TIME: 13.3 SECONDS (ref 12.2–14.5)
RBC # BLD AUTO: 4.48 10*6/MM3 (ref 3.77–5.28)
SODIUM SERPL-SCNC: 138 MMOL/L (ref 136–145)
WBC NRBC COR # BLD: 6.21 10*3/MM3 (ref 3.4–10.8)

## 2023-08-28 PROCEDURE — 85025 COMPLETE CBC W/AUTO DIFF WBC: CPT

## 2023-08-28 PROCEDURE — 85730 THROMBOPLASTIN TIME PARTIAL: CPT

## 2023-08-28 PROCEDURE — G0480 DRUG TEST DEF 1-7 CLASSES: HCPCS

## 2023-08-28 PROCEDURE — 80053 COMPREHEN METABOLIC PANEL: CPT

## 2023-08-28 PROCEDURE — 85610 PROTHROMBIN TIME: CPT

## 2023-08-28 PROCEDURE — 93005 ELECTROCARDIOGRAM TRACING: CPT

## 2023-08-28 PROCEDURE — 83036 HEMOGLOBIN GLYCOSYLATED A1C: CPT

## 2023-08-28 PROCEDURE — 36415 COLL VENOUS BLD VENIPUNCTURE: CPT

## 2023-08-28 RX ORDER — MELATONIN
1000 DAILY
COMMUNITY

## 2023-08-28 NOTE — PAT
An arrival time for procedure was not provided during PAT visit. If patient had any questions or concerns about their arrival time, they were instructed to contact their surgeon/physician.  Additionally, if the patient referred to an arrival time that was acquired from their my chart account, patient was encouraged to verify that time with their surgeon/physician. Arrival times are NOT provided in Pre Admission Testing Department.    Discussed with patient options for receiving total joint replacement education and assessed patient's ability and preference. Joint Replacement Guide given to patient during PAT visit since not received a copy within the last year. Encouraged patient/family to read guide thoroughly and notify PAT staff with any questions or concerns. Handout provided directing patient to links to watch online videos related to joint replacement surgery on the [facility] website. The handout gives detailed instructions for joining an online joint replacement class through Zoom or phone conference offered on Thursdays. Patient agreed to participate by watching videos online. Patient verbalized understanding of instructions and to complete the online learning tool survey. Encouraged to share information with family and/or . An overview of the joint replacement education was provided during the visit including general perioperative instructions that are routine for all surgical patients (PAT PASS, wipes, directions to pre-op, etc.).    Patient denies any current skin issues.     Prescription for Chlorhexidine shower called into patient's pharmacy or [facility] pharmacy by patient's surgeon.  Reinforced with patient to  the prescription from applicable pharmacy if they haven't already.  Verbal and written instructions given regarding proper use of Chlorhexidine body wash to patient and/or famlily during PAT visit. Patient/family also instructed to complete checklist and return it to Pre-op on the  day of surgery.  Patient and/or family verbalized understanding.    Patient to apply Chlorhexadine wipes  to surgical area (as instructed) the night before procedure and the AM of procedure. Wipes provided.    Patient instructed to drink 20 ounces of Gatorade and it needs to be completed 1 hour (for Main OR patients) or 2 hours (scheduled  section & [facility] patients) before given arrival time for procedure (NO RED Gatorade)    Patient verbalized understanding.    Per Anesthesia Request, patient instructed not to take their ACE/ARB medications on the AM of surgery.    It was noted during Pre Admission Testing that patient was wearing some form of fingernail polish (gel/regular) and/or acrylic/artificial nails.  Patient was told that polish and/or artificial nails must be removed for surgery.  If a patient had recent manicure, and would rather not remove polish or artificial nails. Then the minimum requirement is that the polish/artificial nails must be removed from the middle finger on each hand.  Patient verbalized understanding.    If patient was having surgery on an upper extremity, then the patient was instructed that fingernail polish/artificial fingernails must be removed for surgery.  NO EXCEPTIONS.  Patient verbalized understanding.    If patient was having surgery on a lower extremity, then the patient was instructed that toenail polish on both extremities must be removed for surgery.  NO EXCEPTIONS. Patient verbalized understanding.    Patient instructed to bring CPAP mask and tubing to the hospital for overnight stay.  Explained that it is not necessary to bring their CPAP machine to the hospital instead a CPAP machine will be provided for use by the hospital. If patient knows their CPAP settings, those settings will be implemented.  If not, the CPAP machine will be utilized on the auto setting using their mask and tubing.    Patient verbalized understanding.    Cardiac clearance in paper chart  from Dr. Allen dated 7/26/23.

## 2023-08-29 LAB
QT INTERVAL: 416 MS
QTC INTERVAL: 436 MS

## 2023-09-02 LAB
COTININE SERPL-MCNC: 4.4 NG/ML
NICOTINE SERPL-MCNC: <1 NG/ML

## 2023-09-08 ENCOUNTER — ANESTHESIA EVENT (OUTPATIENT)
Dept: PERIOP | Facility: HOSPITAL | Age: 53
End: 2023-09-08
Payer: MEDICARE

## 2023-09-08 RX ORDER — SODIUM CHLORIDE 0.9 % (FLUSH) 0.9 %
10 SYRINGE (ML) INJECTION EVERY 12 HOURS SCHEDULED
Status: CANCELLED | OUTPATIENT
Start: 2023-09-08

## 2023-09-08 RX ORDER — SODIUM CHLORIDE 9 MG/ML
40 INJECTION, SOLUTION INTRAVENOUS AS NEEDED
Status: CANCELLED | OUTPATIENT
Start: 2023-09-08

## 2023-09-11 ENCOUNTER — APPOINTMENT (OUTPATIENT)
Dept: GENERAL RADIOLOGY | Facility: HOSPITAL | Age: 53
End: 2023-09-11
Payer: MEDICARE

## 2023-09-11 ENCOUNTER — ANESTHESIA (OUTPATIENT)
Dept: PERIOP | Facility: HOSPITAL | Age: 53
End: 2023-09-11
Payer: MEDICARE

## 2023-09-11 ENCOUNTER — HOSPITAL ENCOUNTER (OUTPATIENT)
Facility: HOSPITAL | Age: 53
Discharge: HOME | End: 2023-09-11
Attending: ORTHOPAEDIC SURGERY | Admitting: ORTHOPAEDIC SURGERY
Payer: MEDICARE

## 2023-09-11 VITALS
RESPIRATION RATE: 16 BRPM | DIASTOLIC BLOOD PRESSURE: 91 MMHG | OXYGEN SATURATION: 100 % | TEMPERATURE: 98.1 F | HEART RATE: 68 BPM | SYSTOLIC BLOOD PRESSURE: 148 MMHG

## 2023-09-11 DIAGNOSIS — M16.10 HIP ARTHRITIS: ICD-10-CM

## 2023-09-11 DIAGNOSIS — M16.11 PRIMARY OSTEOARTHRITIS OF RIGHT HIP: ICD-10-CM

## 2023-09-11 DIAGNOSIS — Z96.641 STATUS POST TOTAL HIP REPLACEMENT, RIGHT: Primary | ICD-10-CM

## 2023-09-11 PROBLEM — E11.9 DM2 (DIABETES MELLITUS, TYPE 2): Status: ACTIVE | Noted: 2023-09-11

## 2023-09-11 PROBLEM — E66.9 OBESITY (BMI 30-39.9): Status: ACTIVE | Noted: 2023-09-11

## 2023-09-11 PROBLEM — Z99.89 OSA ON CPAP: Status: ACTIVE | Noted: 2023-09-11

## 2023-09-11 PROBLEM — G89.29 CHRONIC PAIN: Status: ACTIVE | Noted: 2023-09-11

## 2023-09-11 PROBLEM — G47.33 OSA ON CPAP: Status: ACTIVE | Noted: 2023-09-11

## 2023-09-11 LAB
GLUCOSE BLDC GLUCOMTR-MCNC: 110 MG/DL (ref 70–130)
GLUCOSE BLDC GLUCOMTR-MCNC: 82 MG/DL (ref 70–130)
POTASSIUM SERPL-SCNC: 4.2 MMOL/L (ref 3.5–5.2)

## 2023-09-11 PROCEDURE — 72170 X-RAY EXAM OF PELVIS: CPT

## 2023-09-11 PROCEDURE — 63710000001 PREGABALIN 75 MG CAPSULE: Performed by: ORTHOPAEDIC SURGERY

## 2023-09-11 PROCEDURE — A9270 NON-COVERED ITEM OR SERVICE: HCPCS | Performed by: ORTHOPAEDIC SURGERY

## 2023-09-11 PROCEDURE — 97165 OT EVAL LOW COMPLEX 30 MIN: CPT

## 2023-09-11 PROCEDURE — 25010000002 CEFAZOLIN IN DEXTROSE 2-4 GM/100ML-% SOLUTION: Performed by: ORTHOPAEDIC SURGERY

## 2023-09-11 PROCEDURE — 97535 SELF CARE MNGMENT TRAINING: CPT

## 2023-09-11 PROCEDURE — 25010000002 PROPOFOL 10 MG/ML EMULSION: Performed by: ANESTHESIOLOGY

## 2023-09-11 PROCEDURE — 25010000002 ONDANSETRON PER 1 MG: Performed by: ANESTHESIOLOGY

## 2023-09-11 PROCEDURE — 25010000002 SUGAMMADEX 200 MG/2ML SOLUTION: Performed by: ANESTHESIOLOGY

## 2023-09-11 PROCEDURE — 25010000002 DEXAMETHASONE PER 1 MG: Performed by: ANESTHESIOLOGY

## 2023-09-11 PROCEDURE — 84132 ASSAY OF SERUM POTASSIUM: CPT | Performed by: ANESTHESIOLOGY

## 2023-09-11 PROCEDURE — 97116 GAIT TRAINING THERAPY: CPT

## 2023-09-11 PROCEDURE — G0378 HOSPITAL OBSERVATION PER HR: HCPCS

## 2023-09-11 PROCEDURE — 97161 PT EVAL LOW COMPLEX 20 MIN: CPT

## 2023-09-11 PROCEDURE — 27130 TOTAL HIP ARTHROPLASTY: CPT | Performed by: ORTHOPAEDIC SURGERY

## 2023-09-11 PROCEDURE — 25010000002 FENTANYL CITRATE (PF) 100 MCG/2ML SOLUTION: Performed by: ANESTHESIOLOGY

## 2023-09-11 PROCEDURE — 25010000002 CEFAZOLIN IN DEXTROSE 2000 MG/ 100 ML SOLUTION: Performed by: ORTHOPAEDIC SURGERY

## 2023-09-11 PROCEDURE — C1713 ANCHOR/SCREW BN/BN,TIS/BN: HCPCS | Performed by: ORTHOPAEDIC SURGERY

## 2023-09-11 PROCEDURE — 25010000002 FENTANYL CITRATE (PF) 50 MCG/ML SOLUTION

## 2023-09-11 PROCEDURE — 82948 REAGENT STRIP/BLOOD GLUCOSE: CPT

## 2023-09-11 PROCEDURE — 25010000002 KETOROLAC TROMETHAMINE PER 15 MG: Performed by: ORTHOPAEDIC SURGERY

## 2023-09-11 PROCEDURE — 63710000001 ACETAMINOPHEN EXTRA STRENGTH 500 MG TABLET: Performed by: ORTHOPAEDIC SURGERY

## 2023-09-11 PROCEDURE — 63710000001 MELOXICAM 15 MG TABLET: Performed by: ORTHOPAEDIC SURGERY

## 2023-09-11 PROCEDURE — 25010000002 ROPIVACAINE PER 1 MG: Performed by: ORTHOPAEDIC SURGERY

## 2023-09-11 PROCEDURE — A9270 NON-COVERED ITEM OR SERVICE: HCPCS | Performed by: ANESTHESIOLOGY

## 2023-09-11 PROCEDURE — 63710000001 FAMOTIDINE 20 MG TABLET: Performed by: ANESTHESIOLOGY

## 2023-09-11 PROCEDURE — 25010000002 HYDROMORPHONE 1 MG/ML SOLUTION

## 2023-09-11 PROCEDURE — C1776 JOINT DEVICE (IMPLANTABLE): HCPCS | Performed by: ORTHOPAEDIC SURGERY

## 2023-09-11 PROCEDURE — 25010000002 MORPHINE PER 10 MG: Performed by: ORTHOPAEDIC SURGERY

## 2023-09-11 PROCEDURE — 27130 TOTAL HIP ARTHROPLASTY: CPT

## 2023-09-11 PROCEDURE — 63710000001 OXYCODONE 10 MG TABLET: Performed by: ORTHOPAEDIC SURGERY

## 2023-09-11 RX ORDER — PROMETHAZINE HYDROCHLORIDE 25 MG/1
25 TABLET ORAL ONCE AS NEEDED
Status: DISCONTINUED | OUTPATIENT
Start: 2023-09-11 | End: 2023-09-11 | Stop reason: HOSPADM

## 2023-09-11 RX ORDER — FENTANYL CITRATE 50 UG/ML
50 INJECTION, SOLUTION INTRAMUSCULAR; INTRAVENOUS
Status: DISCONTINUED | OUTPATIENT
Start: 2023-09-11 | End: 2023-09-11 | Stop reason: HOSPADM

## 2023-09-11 RX ORDER — LABETALOL HYDROCHLORIDE 5 MG/ML
10 INJECTION, SOLUTION INTRAVENOUS EVERY 4 HOURS PRN
Status: DISCONTINUED | OUTPATIENT
Start: 2023-09-11 | End: 2023-09-11 | Stop reason: HOSPADM

## 2023-09-11 RX ORDER — NALOXONE HCL 0.4 MG/ML
0.1 VIAL (ML) INJECTION
Status: DISCONTINUED | OUTPATIENT
Start: 2023-09-11 | End: 2023-09-11 | Stop reason: HOSPADM

## 2023-09-11 RX ORDER — MELOXICAM 15 MG/1
15 TABLET ORAL DAILY
Status: DISCONTINUED | OUTPATIENT
Start: 2023-09-12 | End: 2023-09-11 | Stop reason: HOSPADM

## 2023-09-11 RX ORDER — FAMOTIDINE 20 MG/1
20 TABLET, FILM COATED ORAL ONCE
Status: COMPLETED | OUTPATIENT
Start: 2023-09-11 | End: 2023-09-11

## 2023-09-11 RX ORDER — FAMOTIDINE 10 MG/ML
20 INJECTION, SOLUTION INTRAVENOUS ONCE
Status: DISCONTINUED | OUTPATIENT
Start: 2023-09-11 | End: 2023-09-11 | Stop reason: HOSPADM

## 2023-09-11 RX ORDER — DROPERIDOL 2.5 MG/ML
0.62 INJECTION, SOLUTION INTRAMUSCULAR; INTRAVENOUS ONCE AS NEEDED
Status: DISCONTINUED | OUTPATIENT
Start: 2023-09-11 | End: 2023-09-11 | Stop reason: HOSPADM

## 2023-09-11 RX ORDER — HYDROMORPHONE HYDROCHLORIDE 1 MG/ML
0.5 INJECTION, SOLUTION INTRAMUSCULAR; INTRAVENOUS; SUBCUTANEOUS
Status: DISCONTINUED | OUTPATIENT
Start: 2023-09-11 | End: 2023-09-11 | Stop reason: HOSPADM

## 2023-09-11 RX ORDER — NALOXONE HCL 0.4 MG/ML
0.4 VIAL (ML) INJECTION AS NEEDED
Status: DISCONTINUED | OUTPATIENT
Start: 2023-09-11 | End: 2023-09-11 | Stop reason: HOSPADM

## 2023-09-11 RX ORDER — SODIUM CHLORIDE 0.9 % (FLUSH) 0.9 %
3 SYRINGE (ML) INJECTION EVERY 12 HOURS SCHEDULED
Status: DISCONTINUED | OUTPATIENT
Start: 2023-09-11 | End: 2023-09-11 | Stop reason: HOSPADM

## 2023-09-11 RX ORDER — ASPIRIN 81 MG/1
81 TABLET ORAL 2 TIMES DAILY
Qty: 60 TABLET | Refills: 0 | Status: SHIPPED | OUTPATIENT
Start: 2023-09-12

## 2023-09-11 RX ORDER — ROCURONIUM BROMIDE 10 MG/ML
INJECTION, SOLUTION INTRAVENOUS AS NEEDED
Status: DISCONTINUED | OUTPATIENT
Start: 2023-09-11 | End: 2023-09-11 | Stop reason: SURG

## 2023-09-11 RX ORDER — CEFAZOLIN SODIUM 2 G/100ML
2000 INJECTION, SOLUTION INTRAVENOUS EVERY 8 HOURS
Status: DISCONTINUED | OUTPATIENT
Start: 2023-09-11 | End: 2023-09-11 | Stop reason: HOSPADM

## 2023-09-11 RX ORDER — ONDANSETRON 2 MG/ML
4 INJECTION INTRAMUSCULAR; INTRAVENOUS ONCE AS NEEDED
Status: DISCONTINUED | OUTPATIENT
Start: 2023-09-11 | End: 2023-09-11 | Stop reason: HOSPADM

## 2023-09-11 RX ORDER — ONDANSETRON 2 MG/ML
INJECTION INTRAMUSCULAR; INTRAVENOUS AS NEEDED
Status: DISCONTINUED | OUTPATIENT
Start: 2023-09-11 | End: 2023-09-11 | Stop reason: SURG

## 2023-09-11 RX ORDER — SODIUM CHLORIDE 9 MG/ML
40 INJECTION, SOLUTION INTRAVENOUS AS NEEDED
Status: DISCONTINUED | OUTPATIENT
Start: 2023-09-11 | End: 2023-09-11 | Stop reason: HOSPADM

## 2023-09-11 RX ORDER — SODIUM CHLORIDE 0.9 % (FLUSH) 0.9 %
10 SYRINGE (ML) INJECTION AS NEEDED
Status: DISCONTINUED | OUTPATIENT
Start: 2023-09-11 | End: 2023-09-11 | Stop reason: HOSPADM

## 2023-09-11 RX ORDER — OXYCODONE HYDROCHLORIDE 10 MG/1
10 TABLET ORAL EVERY 4 HOURS PRN
Status: DISCONTINUED | OUTPATIENT
Start: 2023-09-11 | End: 2023-09-11

## 2023-09-11 RX ORDER — MAGNESIUM HYDROXIDE 1200 MG/15ML
LIQUID ORAL AS NEEDED
Status: DISCONTINUED | OUTPATIENT
Start: 2023-09-11 | End: 2023-09-11 | Stop reason: HOSPADM

## 2023-09-11 RX ORDER — SODIUM CHLORIDE, SODIUM LACTATE, POTASSIUM CHLORIDE, CALCIUM CHLORIDE 600; 310; 30; 20 MG/100ML; MG/100ML; MG/100ML; MG/100ML
9 INJECTION, SOLUTION INTRAVENOUS CONTINUOUS
Status: DISCONTINUED | OUTPATIENT
Start: 2023-09-11 | End: 2023-09-11 | Stop reason: HOSPADM

## 2023-09-11 RX ORDER — HYDRALAZINE HYDROCHLORIDE 20 MG/ML
5 INJECTION INTRAMUSCULAR; INTRAVENOUS
Status: DISCONTINUED | OUTPATIENT
Start: 2023-09-11 | End: 2023-09-11 | Stop reason: HOSPADM

## 2023-09-11 RX ORDER — ACETAMINOPHEN 500 MG
1000 TABLET ORAL ONCE
Status: COMPLETED | OUTPATIENT
Start: 2023-09-11 | End: 2023-09-11

## 2023-09-11 RX ORDER — LIDOCAINE HYDROCHLORIDE 10 MG/ML
INJECTION, SOLUTION EPIDURAL; INFILTRATION; INTRACAUDAL; PERINEURAL AS NEEDED
Status: DISCONTINUED | OUTPATIENT
Start: 2023-09-11 | End: 2023-09-11 | Stop reason: SURG

## 2023-09-11 RX ORDER — LIDOCAINE HYDROCHLORIDE 10 MG/ML
0.5 INJECTION, SOLUTION EPIDURAL; INFILTRATION; INTRACAUDAL; PERINEURAL ONCE AS NEEDED
Status: COMPLETED | OUTPATIENT
Start: 2023-09-11 | End: 2023-09-11

## 2023-09-11 RX ORDER — MIDAZOLAM HYDROCHLORIDE 1 MG/ML
1 INJECTION INTRAMUSCULAR; INTRAVENOUS
Status: DISCONTINUED | OUTPATIENT
Start: 2023-09-11 | End: 2023-09-11 | Stop reason: HOSPADM

## 2023-09-11 RX ORDER — LABETALOL HYDROCHLORIDE 5 MG/ML
5 INJECTION, SOLUTION INTRAVENOUS
Status: DISCONTINUED | OUTPATIENT
Start: 2023-09-11 | End: 2023-09-11 | Stop reason: HOSPADM

## 2023-09-11 RX ORDER — ONDANSETRON 4 MG/1
4 TABLET, FILM COATED ORAL EVERY 6 HOURS PRN
Status: DISCONTINUED | OUTPATIENT
Start: 2023-09-11 | End: 2023-09-11 | Stop reason: HOSPADM

## 2023-09-11 RX ORDER — ACETAMINOPHEN 500 MG
1000 TABLET ORAL EVERY 8 HOURS
Status: DISCONTINUED | OUTPATIENT
Start: 2023-09-11 | End: 2023-09-11 | Stop reason: HOSPADM

## 2023-09-11 RX ORDER — OXYCODONE HYDROCHLORIDE 5 MG/1
5 TABLET ORAL EVERY 4 HOURS PRN
Qty: 25 TABLET | Refills: 0 | Status: SHIPPED | OUTPATIENT
Start: 2023-09-11

## 2023-09-11 RX ORDER — MELOXICAM 15 MG/1
15 TABLET ORAL ONCE
Status: COMPLETED | OUTPATIENT
Start: 2023-09-11 | End: 2023-09-11

## 2023-09-11 RX ORDER — IPRATROPIUM BROMIDE AND ALBUTEROL SULFATE 2.5; .5 MG/3ML; MG/3ML
3 SOLUTION RESPIRATORY (INHALATION) ONCE AS NEEDED
Status: DISCONTINUED | OUTPATIENT
Start: 2023-09-11 | End: 2023-09-11 | Stop reason: HOSPADM

## 2023-09-11 RX ORDER — OXYCODONE HYDROCHLORIDE 15 MG/1
15 TABLET ORAL EVERY 4 HOURS PRN
Status: DISCONTINUED | OUTPATIENT
Start: 2023-09-11 | End: 2023-09-11 | Stop reason: HOSPADM

## 2023-09-11 RX ORDER — FENTANYL CITRATE 50 UG/ML
INJECTION, SOLUTION INTRAMUSCULAR; INTRAVENOUS
Status: COMPLETED
Start: 2023-09-11 | End: 2023-09-11

## 2023-09-11 RX ORDER — ASPIRIN 81 MG/1
81 TABLET ORAL EVERY 12 HOURS SCHEDULED
Status: DISCONTINUED | OUTPATIENT
Start: 2023-09-12 | End: 2023-09-11 | Stop reason: HOSPADM

## 2023-09-11 RX ORDER — NALOXONE HYDROCHLORIDE 4 MG/.1ML
SPRAY NASAL
Qty: 2 EACH | Refills: 0 | Status: SHIPPED | OUTPATIENT
Start: 2023-09-11

## 2023-09-11 RX ORDER — OXYCODONE HYDROCHLORIDE 5 MG/1
5 TABLET ORAL EVERY 4 HOURS PRN
Status: DISCONTINUED | OUTPATIENT
Start: 2023-09-11 | End: 2023-09-11

## 2023-09-11 RX ORDER — DROPERIDOL 2.5 MG/ML
0.62 INJECTION, SOLUTION INTRAMUSCULAR; INTRAVENOUS
Status: DISCONTINUED | OUTPATIENT
Start: 2023-09-11 | End: 2023-09-11 | Stop reason: HOSPADM

## 2023-09-11 RX ORDER — HYDROCODONE BITARTRATE AND ACETAMINOPHEN 5; 325 MG/1; MG/1
1 TABLET ORAL ONCE AS NEEDED
Status: DISCONTINUED | OUTPATIENT
Start: 2023-09-11 | End: 2023-09-11 | Stop reason: HOSPADM

## 2023-09-11 RX ORDER — SODIUM CHLORIDE 0.9 % (FLUSH) 0.9 %
3-10 SYRINGE (ML) INJECTION AS NEEDED
Status: DISCONTINUED | OUTPATIENT
Start: 2023-09-11 | End: 2023-09-11 | Stop reason: HOSPADM

## 2023-09-11 RX ORDER — PROMETHAZINE HYDROCHLORIDE 25 MG/1
25 SUPPOSITORY RECTAL ONCE AS NEEDED
Status: DISCONTINUED | OUTPATIENT
Start: 2023-09-11 | End: 2023-09-11 | Stop reason: HOSPADM

## 2023-09-11 RX ORDER — PROPOFOL 10 MG/ML
VIAL (ML) INTRAVENOUS AS NEEDED
Status: DISCONTINUED | OUTPATIENT
Start: 2023-09-11 | End: 2023-09-11 | Stop reason: SURG

## 2023-09-11 RX ORDER — CEFAZOLIN SODIUM 2 G/100ML
2 INJECTION, SOLUTION INTRAVENOUS ONCE
Status: COMPLETED | OUTPATIENT
Start: 2023-09-11 | End: 2023-09-11

## 2023-09-11 RX ORDER — TRANEXAMIC ACID 10 MG/ML
1000 INJECTION, SOLUTION INTRAVENOUS ONCE
Status: COMPLETED | OUTPATIENT
Start: 2023-09-11 | End: 2023-09-11

## 2023-09-11 RX ORDER — DEXAMETHASONE SODIUM PHOSPHATE 4 MG/ML
INJECTION, SOLUTION INTRA-ARTICULAR; INTRALESIONAL; INTRAMUSCULAR; INTRAVENOUS; SOFT TISSUE AS NEEDED
Status: DISCONTINUED | OUTPATIENT
Start: 2023-09-11 | End: 2023-09-11 | Stop reason: SURG

## 2023-09-11 RX ORDER — MEPERIDINE HYDROCHLORIDE 25 MG/ML
12.5 INJECTION INTRAMUSCULAR; INTRAVENOUS; SUBCUTANEOUS
Status: DISCONTINUED | OUTPATIENT
Start: 2023-09-11 | End: 2023-09-11 | Stop reason: HOSPADM

## 2023-09-11 RX ORDER — POLYETHYLENE GLYCOL 3350 17 G/17G
17 POWDER, FOR SOLUTION ORAL DAILY
Qty: 238 G | Refills: 0 | Status: SHIPPED | OUTPATIENT
Start: 2023-09-11 | End: 2023-09-26

## 2023-09-11 RX ORDER — SODIUM CHLORIDE 9 MG/ML
100 INJECTION, SOLUTION INTRAVENOUS CONTINUOUS
Status: DISCONTINUED | OUTPATIENT
Start: 2023-09-11 | End: 2023-09-11 | Stop reason: HOSPADM

## 2023-09-11 RX ORDER — ONDANSETRON 2 MG/ML
4 INJECTION INTRAMUSCULAR; INTRAVENOUS EVERY 6 HOURS PRN
Status: DISCONTINUED | OUTPATIENT
Start: 2023-09-11 | End: 2023-09-11 | Stop reason: HOSPADM

## 2023-09-11 RX ORDER — FENTANYL CITRATE 50 UG/ML
INJECTION, SOLUTION INTRAMUSCULAR; INTRAVENOUS AS NEEDED
Status: DISCONTINUED | OUTPATIENT
Start: 2023-09-11 | End: 2023-09-11 | Stop reason: SURG

## 2023-09-11 RX ORDER — PREGABALIN 75 MG/1
75 CAPSULE ORAL ONCE
Status: COMPLETED | OUTPATIENT
Start: 2023-09-11 | End: 2023-09-11

## 2023-09-11 RX ADMIN — MELOXICAM 15 MG: 15 TABLET ORAL at 09:30

## 2023-09-11 RX ADMIN — LIDOCAINE HYDROCHLORIDE 50 MG: 10 INJECTION, SOLUTION EPIDURAL; INFILTRATION; INTRACAUDAL; PERINEURAL at 10:26

## 2023-09-11 RX ADMIN — OXYCODONE HYDROCHLORIDE 10 MG: 10 TABLET ORAL at 14:23

## 2023-09-11 RX ADMIN — ONDANSETRON 4 MG: 2 INJECTION INTRAMUSCULAR; INTRAVENOUS at 11:51

## 2023-09-11 RX ADMIN — CEFAZOLIN SODIUM 2000 MG: 2 INJECTION, SOLUTION INTRAVENOUS at 17:08

## 2023-09-11 RX ADMIN — ROCURONIUM BROMIDE 30 MG: 10 SOLUTION INTRAVENOUS at 10:40

## 2023-09-11 RX ADMIN — ROCURONIUM BROMIDE 20 MG: 10 SOLUTION INTRAVENOUS at 11:30

## 2023-09-11 RX ADMIN — SODIUM CHLORIDE, POTASSIUM CHLORIDE, SODIUM LACTATE AND CALCIUM CHLORIDE 9 ML/HR: 600; 310; 30; 20 INJECTION, SOLUTION INTRAVENOUS at 09:35

## 2023-09-11 RX ADMIN — FENTANYL CITRATE 50 MCG: 50 INJECTION, SOLUTION INTRAMUSCULAR; INTRAVENOUS at 11:07

## 2023-09-11 RX ADMIN — ACETAMINOPHEN 1000 MG: 500 TABLET ORAL at 14:24

## 2023-09-11 RX ADMIN — SUGAMMADEX 200 MG: 100 INJECTION, SOLUTION INTRAVENOUS at 12:05

## 2023-09-11 RX ADMIN — ACETAMINOPHEN 1000 MG: 500 TABLET ORAL at 09:30

## 2023-09-11 RX ADMIN — PROPOFOL 150 MG: 10 INJECTION, EMULSION INTRAVENOUS at 10:26

## 2023-09-11 RX ADMIN — TRANEXAMIC ACID 1000 MG: 10 INJECTION, SOLUTION INTRAVENOUS at 10:30

## 2023-09-11 RX ADMIN — HYDROMORPHONE HYDROCHLORIDE 0.5 MG: 1 INJECTION, SOLUTION INTRAMUSCULAR; INTRAVENOUS; SUBCUTANEOUS at 12:59

## 2023-09-11 RX ADMIN — FENTANYL CITRATE 50 MCG: 50 INJECTION, SOLUTION INTRAMUSCULAR; INTRAVENOUS at 12:54

## 2023-09-11 RX ADMIN — PROPOFOL 25 MCG/KG/MIN: 10 INJECTION, EMULSION INTRAVENOUS at 10:40

## 2023-09-11 RX ADMIN — PREGABALIN 75 MG: 75 CAPSULE ORAL at 09:30

## 2023-09-11 RX ADMIN — FAMOTIDINE 20 MG: 20 TABLET, FILM COATED ORAL at 09:30

## 2023-09-11 RX ADMIN — CEFAZOLIN SODIUM 2 G: 2 INJECTION, SOLUTION INTRAVENOUS at 10:24

## 2023-09-11 RX ADMIN — FENTANYL CITRATE 50 MCG: 50 INJECTION, SOLUTION INTRAMUSCULAR; INTRAVENOUS at 12:20

## 2023-09-11 RX ADMIN — TRANEXAMIC ACID 1000 MG: 10 INJECTION, SOLUTION INTRAVENOUS at 11:51

## 2023-09-11 RX ADMIN — DEXAMETHASONE SODIUM PHOSPHATE 4 MG: 4 INJECTION, SOLUTION INTRAMUSCULAR; INTRAVENOUS at 10:30

## 2023-09-11 RX ADMIN — SODIUM CHLORIDE, POTASSIUM CHLORIDE, SODIUM LACTATE AND CALCIUM CHLORIDE: 600; 310; 30; 20 INJECTION, SOLUTION INTRAVENOUS at 10:17

## 2023-09-11 RX ADMIN — SODIUM CHLORIDE 100 ML/HR: 9 INJECTION, SOLUTION INTRAVENOUS at 15:32

## 2023-09-11 RX ADMIN — LIDOCAINE HYDROCHLORIDE 0.5 ML: 10 INJECTION, SOLUTION EPIDURAL; INFILTRATION; INTRACAUDAL; PERINEURAL at 09:35

## 2023-09-11 NOTE — ANESTHESIA PREPROCEDURE EVALUATION
Anesthesia Evaluation     Patient summary reviewed and Nursing notes reviewed   no history of anesthetic complications:   NPO Solid Status: > 8 hours  NPO Liquid Status: > 2 hours           Airway   Mallampati: II  TM distance: >3 FB  Neck ROM: full  No difficulty expected  Dental - normal exam     Pulmonary - normal exam    breath sounds clear to auscultation  (+) a smoker (quit x 2 months) Former,sleep apnea on CPAP  Cardiovascular - normal exam    ECG reviewed  Rhythm: regular  Rate: normal    (+) hypertension, CAD (Minimal obstructive RCA disease), angina      Neuro/Psych- negative ROS  GI/Hepatic/Renal/Endo    (+) diabetes mellitus (On ozempic - last dose ~ 7 days ago)    Musculoskeletal     (+) back pain  Abdominal    Substance History      OB/GYN          Other   arthritis,                   Anesthesia Plan    ASA 3     general and spinal   Rapid sequence  (Spinal for analgesia/wound healing benefits  GETA for airway protection d/t ozempic)  intravenous induction     Anesthetic plan, risks, benefits, and alternatives have been provided, discussed and informed consent has been obtained with: patient.    Plan discussed with CRNA.    CODE STATUS:

## 2023-09-11 NOTE — H&P
Patient Name: Kelsi Costa  MRN: [id number]  : 1970  DOS: 2023    Attending: Alin Lewis MD    Primary Care Provider: Blank Mckeon APRN      Chief complaint:  Right hip pain    Subjective   Patient is a pleasant 53 y.o. female presented for scheduled surgery by .    Per his note:(This patient has a history of progressive right hip pain and arthritis. The hip pain is severe with activity and has progressed significantly. Non-operative treatment has been attempted, but has not improved or controlled symptoms during normal daily activities. Motion has become limited and rotation severely restricted. X-rays reveal moderate-to-severe eburnation of articular cartilage on the superior weight bearing surface of the hip with circumferential acetabular and femoral neck osteophytes consistent with advanced hip osteoarthritis. A total hip arthroplasty was recommended at this time. )    She underwent right total hip arthroplasty by Dr. Lewis.  Surgery was done under general anesthesia and periarticular block.  She tolerated surgery well and was admitted for further management.    Seen postoperatively, doing well with good pain control, no complains of nausea, vomiting, or shortness of breath.    Motivated to achieve goals for home discharge later in the day.    Reviewed with patient her past medical history and home medication regimen.    She has no history of DVT or PE.    Allergies:  Allergies   Allergen Reactions    Levaquin [Levofloxacin] Itching and Rash       Medications Prior to Admission   Medication Sig Dispense Refill Last Dose    atorvastatin (LIPITOR) 80 MG tablet Take 1 tablet by mouth Every Night.   9/10/2023 at     azelastine (ASTELIN) 0.1 % nasal spray 1 spray into the nostril(s) as directed by provider 4 (Four) Times a Day As Needed.   9/10/2023 at 2100    chlorhexidine (HIBICLENS) 4 % external liquid Apply  topically to the appropriate area as directed Daily As  Needed for Wound Care. Shower daily with hibiclens solution as directed 5 days prior to surgery. 236 mL 0 9/10/2023    cholecalciferol (VITAMIN D3) 25 MCG (1000 UT) tablet Take 1 tablet by mouth Daily.   9/10/2023    diltiaZEM (CARDIZEM) 60 MG tablet Take 1 tablet by mouth Every Evening.   9/10/2023 at 2100    estradiol (ESTRACE) 1 MG tablet Take 1 tablet by mouth Daily.   9/10/2023 at 2100    fluticasone (FLONASE) 50 MCG/ACT nasal spray 2 sprays into the nostril(s) as directed by provider Daily.   9/10/2023 at 2100    gabapentin (NEURONTIN) 800 MG tablet Take 1 tablet by mouth 2 (two) times a day.   9/10/2023 at 2100    levocetirizine (XYZAL) 5 MG tablet Take 1 tablet by mouth Every Evening.   9/10/2023 at 2100    losartan-hydrochlorothiazide (HYZAAR) 100-25 MG per tablet Take 1 tablet by mouth Every Evening.   9/10/2023 at 2100    metFORMIN (GLUCOPHAGE) 500 MG tablet Take 1 tablet by mouth 2 (Two) Times a Day With Meals. Patient takes only once daily instead of twice daily   9/10/2023 at 2100    methocarbamol (ROBAXIN) 500 MG tablet Take 1 tablet by mouth Daily As Needed for Muscle Spasms.   9/10/2023 at 2100    Movantik 25 MG tablet TAKE 1/2 TABLET BY MOUTH EVERY DAY FOR CONSTIPATION   9/10/2023 at 2100    ondansetron ODT (ZOFRAN-ODT) 4 MG disintegrating tablet Place 1 tablet on the tongue As Needed for Nausea or Vomiting.   9/10/2023 at 2000    oxyCODONE (ROXICODONE) 10 MG tablet Take 1 tablet by mouth Every 8 (Eight) Hours As Needed for Moderate Pain.   9/10/2023 at 2100    potassium chloride 10 MEQ CR tablet Take 1 tablet by mouth Daily.   9/10/2023 at 2100    ranolazine (RANEXA) 500 MG 12 hr tablet Take 1 tablet by mouth 2 (Two) Times a Day. Patient states she takes both tablets together QPM instead of 1 tablet BID   9/10/2023 at 2100    Ventolin  (90 Base) MCG/ACT inhaler Inhale 2 puffs See Admin Instructions. Inhale 2 puffs by mouth every 4 to 6 hours as needed   9/10/2023 at 2100    vitamin B-12  (CYANOCOBALAMIN) 1000 MCG tablet Take 1 tablet by mouth Daily.   9/10/2023 at 2100    amitriptyline (ELAVIL) 10 MG tablet Take 1 tablet by mouth Every Night.       budesonide (RINOCORT AQUA) 32 MCG/ACT nasal spray 1 spray into the nostril(s) as directed by provider 2 (Two) Times a Day. (Patient not taking: Reported on 2023)   Not Taking    cefdinir (OMNICEF) 300 MG capsule 1 capsule. (Patient not taking: Reported on 2023)   Not Taking    diclofenac (VOLTAREN) 50 MG EC tablet 2 (Two) Times a Day. (Patient not taking: Reported on 2023)   Not Taking    fluconazole (DIFLUCAN) 150 MG tablet 1 tablet. (Patient not taking: Reported on 2023)   Not Taking    nitroglycerin (NITROSTAT) 0.4 MG SL tablet Place 1 tablet under the tongue Every 5 (Five) Minutes As Needed for Chest Pain. Take no more than 3 doses in 15 minutes. 90 tablet 0 More than a month    Ozempic, 1 MG/DOSE, 4 MG/3ML solution pen-injector Inject 1 mg under the skin into the appropriate area as directed 1 (One) Time Per Week. 2023    topiramate (TOPAMAX) 50 MG tablet  (Patient not taking: Reported on 2023)   Not Taking           Past Medical History:   Diagnosis Date    Angina at rest     Arthritis of neck     Asthma     Back pain     Bursitis of hip     Cervical disc disorder     Diabetes mellitus     Elevated cholesterol     Fibromyalgia     Headache     Hip arthrosis     Hyperlipidemia     Hypertension     Knee swelling     Low back strain     Lumbosacral disc disease     Mitral valve prolapse     Sleep apnea     CPAP compliant, auto pressure adjustment per patient    Tear of meniscus of knee     Tendinitis of knee     Vertigo     Wears glasses      Past Surgical History:   Procedure Laterality Date    CARDIAC CATHETERIZATION N/A 2017    Procedure: Left Heart Cath;  Surgeon: Darnell Allen MD;  Location: [facility] CATH INVASIVE LOCATION;  Service:      SECTION       COLONOSCOPY      ESSURE TUBAL LIGATION      FEMORAL ARTERY REPAIR Right     HYSTERECTOMY      JOINT REPLACEMENT Bilateral     L knee 2019 R knee ? 2008    KNEE ARTHROPLASTY, PARTIAL REPLACEMENT Left     TOTAL KNEE ARTHROPLASTY Right     TRIGGER POINT INJECTION      May 2022 in R hip     Family History   Problem Relation Age of Onset    Asthma Mother     Arthritis Mother     Anesthesia problems Mother     Cancer Mother     Hypertension Father     Arthritis Father     Diabetes Father     Heart disease Brother     Scoliosis Brother     Diabetes Maternal Grandmother     Osteoporosis Maternal Grandmother      Social History     Tobacco Use    Smoking status: Former     Types: Cigars     Start date: 2017     Quit date: 2023     Years since quittin.2     Passive exposure: Current    Smokeless tobacco: Never   Vaping Use    Vaping Use: Never used   Substance Use Topics    Alcohol use: Not Currently     Comment: Occasionally, approx 1 per month at most    Drug use: No   .    Review of Systems  Pertinent items are noted in HPI    Vital Signs  /93   Pulse 67   Temp 97.4 °F (36.3 °C) (Temporal)   Resp 18   SpO2 100%     Physical Exam:    General Appearance:    Alert, cooperative, in no acute distress   Head:    Normocephalic, without obvious abnormality, atraumatic   Eyes:            Lids and lashes normal, conjunctivae and sclerae normal, no   icterus, no pallor, corneas clear    Ears:    Ears appear intact with no abnormalities noted   Throat:   No oral lesions, no thrush, oral mucosa moist   Neck:   No adenopathy, supple, trachea midline, no thyromegaly         Lungs:     Clear to auscultation,respirations regular, even and   unlabored. No wheezes or rales.    Heart:    Regular rhythm and normal rate, normal S1 and S2, no  murmur, no gallop   Abdomen:     Normal bowel sounds, no masses, no organomegaly, soft        non-tender, non-distended, no guarding, no rebound                 tenderness    Genitalia:    Deferred   Extremities:  RLE, CDI dressing , aquacel over right  hip. No c/c/e   Pulses:   Pulses palpable and equal bilaterally   Skin:   No bleeding, bruising or rash   Neurologic:   Cranial nerves 2 - 12 grossly intact, Flexion and dorsiflexion intact bilateral feet.        I reviewed the patient's new clinical results.             Invalid input(s): NEUTOPHILPCT,  EOSPCT  Results from last 7 days   Lab Units 09/11/23  0929   POTASSIUM mmol/L 4.2     Lab Results   Component Value Date    HGBA1C 6.20 (H) 08/28/2023        Latest Reference Range & Units 08/28/23 15:00   Sodium 136 - 145 mmol/L 138   Potassium 3.5 - 5.2 mmol/L 3.3 (L)   Chloride 98 - 107 mmol/L 97 (L)   CO2 22.0 - 29.0 mmol/L 31.0 (H)   Anion Gap 5.0 - 15.0 mmol/L 10.0   BUN 6 - 20 mg/dL 6   Creatinine 0.57 - 1.00 mg/dL 0.98   BUN/Creatinine Ratio 7.0 - 25.0  6.1 (L)   eGFR >60.0 mL/min/1.73 70.0   Glucose 65 - 99 mg/dL 111 (H)   Calcium 8.6 - 10.5 mg/dL 9.3   Alkaline Phosphatase 39 - 117 U/L 76   Total Protein 6.0 - 8.5 g/dL 7.4   Albumin 3.5 - 5.2 g/dL 4.1   Globulin gm/dL 3.3   A/G Ratio g/dL 1.2   AST (SGOT) 1 - 32 U/L 21   ALT (SGPT) 1 - 33 U/L 15   Total Bilirubin 0.0 - 1.2 mg/dL 0.3   Hemoglobin A1C 4.80 - 5.60 % 6.20 (H)   Protime 12.2 - 14.5 Seconds 13.3   INR 0.89 - 1.12  1.00   PTT 22.0 - 39.0 seconds 31.0   WBC 3.40 - 10.80 10*3/mm3 6.21   RBC 3.77 - 5.28 10*6/mm3 4.48   Hemoglobin 12.0 - 15.9 g/dL 12.7   Hematocrit 34.0 - 46.6 % 39.6   Platelets 140 - 450 10*3/mm3 286   RDW 12.3 - 15.4 % 13.2   MCV 79.0 - 97.0 fL 88.4   MCH 26.6 - 33.0 pg 28.3   MCHC 31.5 - 35.7 g/dL 32.1   MPV 6.0 - 12.0 fL 10.5   RDW-SD 37.0 - 54.0 fl 43.2   Neutrophil Rel % 42.7 - 76.0 % 44.5   (L): Data is abnormally low  (H): Data is abnormally high    Assessment and Plan:       Status post total hip replacement, right    Hip arthritis    Chronic pain    DM2 (diabetes mellitus, type 2)    MARY on CPAP    Obesity (BMI 30-39.9)      Plan:    1. PT/OT,  protected Weight bearing as tolerated RLE.  Posterior hip precautions for 6 weeks  2. Pain control-prns  3. IS-encourage  4. DVT proph- Mechanicals and aspirin  5. Bowel regimen  6. Resume home medications as appropriate  7. DC planning for home    Patient is very motivated to work with physical therapy and achieve  mobility and pain control among other goals for possible discharge home later in the day.    We reviewed these goals and discussed with patient tracking  progress for the next few hours and if all is achieved to receive next antibiotic prophylactic dose and be discharged home.     We discussed medications and precriptions at time of discharge including DVT prophylaxis, pain control, and bowel regimen.  All questions were answered .    Patient expressed understanding and agreement.wy.      Dragon disclaimer:  Part of this encounter note is an electronic transcription/translation of spoken language to printed text. The electronic translation of spoken language may permit erroneous, or at times, nonsensical words or phrases to be inadvertently transcribed; Although I have reviewed the note for such errors, some may still exist.    Ameena Collins MD  09/11/23  14:30 EDT

## 2023-09-11 NOTE — DISCHARGE INSTRUCTIONS
"DISCHARGE INSTRUCTIONS   Dr. Lewis     Total Hip Replacement/Hip Hemiarthroplasty     Wound Care   1) Keep wound / incision area clean and dry.   2) Dressing to remain in place until post-operative day 7. Upon dressing removal, assess for wound drainage. If no drainage is present, keep wound / incision area open to air as much as possible. If drainage is present, place sterile dressing to cover wound and assess daily. If drainage continues to occur after post-operative day 14, call the office for an urgent appointment. (You should be seen in the clinic within 1-2 days of calling). DO NOT REMOVE SUTURES (IF PRESENT) UNDER ANY CIRCUMSTANCES PRIOR TO FOLLOW UP APPOINTMENT.  3) No baths or swimming until otherwise instructed. The wound must remain dry for 10 days after surgery. After 10 days, you may begin to shower only if no drainage is present. No submerging the wound under standing water until cleared by your physician (no baths, hot tubs, swimming pools, etc). Sponge baths are the best way to perform personal hygiene while at the same time protecting the wound from moisture.   4) Prior to showering, the wound must remain dry for 72 consecutive hours (no drainage whatsoever) prior to showering. If the wound drains or spots, the clock \"resets\" - make sure the wound has been drainage-free for 72 consecutive hours.   5) Once you are allowed to get the wound wet, please use gentle soap to wash the wound area. DO NOT aggressively scrub the wound with a washcloth or bath sponge. Please visually inspect your wound(s) at least once daily. If the wound(s) are in a difficult to see location, please use a mirror or have someone else assist with visual inspection.   6) No scrubbing the wound. You may \"pad dry\" the wound, but do not rub, as this may open up the wound and pre-dispose to wound infection.   7) Do not apply lotions or creams to incision site, unless instructed otherwise.   8) Observe for redness, swelling, or " drainage. Please call the clinic immediately if you have fevers, chills with warmth/redness surrounding wound site or if you notice pus drainage from the wound site     Activity   No heavy lifting objects greater than 10 pounds.   No driving while on narcotic pain medication.   No submerging wound under standing water (pool, bath tub, etc.) until otherwise instructed.   You may be protected weightbearing as tolerated on your operative (right lower) extremity   Use a walker for ambulation for at least 2 weeks after surgery.  May wean from walker after 2 weeks if approved by your therapist.   Posterior hip precautions for 6 weeks: No bending the hip past 90 degrees. Do not allow the leg to cross the midline of your body (adduction). No twisting motions. Ask your physical therapist to review these precautions with you.     Blood Clot Prophylaxis   (Aspirin vs. Lovenox vs. Eliquis administration is determined by your surgeon and tailored to your specific risk profile. You will be discharged with one of these medications.) You will need to complete a total 4 week course of enteric coated aspirin 325 mg (or 81mg) twice daily or Eliquis 2.5mg twice daily, in order to minimize your risk of blood clots following surgery. You will be supplied with a prescription to obtain this. Alternatively, you will need to compete a total 2 week course of Lovenox after surgery (followed by a 2 week course of aspirin twice daily), in order to minimize the risk of blood clots following surgery. Lovenox requires a single shot in the abdomen, to be taken once daily. You will be supplied with the prescription to obtain this. Prior to your discharge from the hospital, the nursing staff will instruct you on self-administration of the Lovenox, if you will be returning directly home from the hospital.     Discharge Pain Medications   You will be given a prescription for pain medication. You should start taking this the same day after your surgery.  "Wean off as tolerated. Do not wait to take the pain medication until the pain is severe, as it will be difficult to \"catch up\" once this occurs. The pain medication usually reaches its full effect ~1 hour after ingesting. If you have been sent home on Colace, this medication should be taken until you are off all narcotic (i.e. Oxycodone, etc) pain medications, in order to prevent constipation. If you have been sent home with a combination of oxycodone and Tylenol, please take Tylenol as scheduled.  You must be careful not to exceed 4,000mg (4 grams) of Tylenol. The oxycodone is to be taken as needed for \"breakthrough\" pain.  Some common side effects of the narcotic pain medications include nausea and itching. Benadryl is a great over the counter medication that helps calm your stomach, decreases your anxiety levels, and minimizes the itching. You can easily purchase this at your local pharmacy as an over-the-counter medication. Please abide by the instructions as printed on the bottle. If your nausea persists, make sure to take small amounts of crackers or other lighter foods.     Follow-Up   Follow-up with Dr. Lewis's office in 3 weeks from the surgery date for a post-operative evaluation. Have the following xrays done upon arrival to the follow-up appointment: AP pelvis. Please call Dr. Lewis's office at [phone] for orthopaedic appointments or questions.  "

## 2023-09-11 NOTE — PLAN OF CARE
Goal Outcome Evaluation:  Plan of Care Reviewed With: patient, spouse        Progress: no change  Outcome Evaluation: Pt amb in tracy with FWW and CGAx2. No knee buckling or LOB noted. Slow gait speed and heavy reliance on FWW noted. HEP and precautions reviewed. Recommend d/c home with assist and OPPT. Pt cleared to d/c today from PT standpoint. Pt will need FWW prior to d/c      Anticipated Discharge Disposition (PT): home with assist, home with outpatient therapy services

## 2023-09-11 NOTE — OP NOTE
OPERATIVE REPORT     DATE OF PROCEDURE: 9/11/2023    SURGEON: Alin Lewis M.D.     ASSISTANT(S): Circulator: Gucci Joy RN; Jayant Echols RN; Elsie Hoskins RN  Radiology Technologist: Melissa Saez  Scrub Person: LunsfordGardenia  Vendor Representative: Meek Cline  Nursing Assistant: Kevin Godfrey PCT  Assistant: Wero Tello PA-C  Assistant: Wero Tello PA-C    Note-PA was utilized during the case to facilitate positioning the patient, exposure, retraction, placement of final components and definitive closure.    PREOPERATIVE DIAGNOSIS: Advanced degenerative joint disease of the right hip secondary to osteoarthritis    POSTOPERATIVE DIAGNOSIS: same     PROCEDURE: Right total Hip Arthroplasty     SURGICAL DETAILS:     APPROACH: Posterior    ANESTHESIA: General plus local periarticular block    PREOPERATIVE ANTIBIOTICS: Ancef 2 g IV    TRANEXAMIC ACID: IV    ESTIMATED BLOOD LOSS: 250 cc     SPECIMENS: None    IMPLANTS:   : Omer  Acetabular component: 48 mm Trident 2   Acetabular screws: 2  Acetabular liner: 0 degree X3   Femoral component: Accolade  degree size 4  Femoral head: 36+0 mm Biolox delta ceramic     DRAINS: None    LOCAL INJECTION: 1 cc Toradol 30mg/ml, 4 cc duramorph 2mg/ml, 20 cc 0.5% ropivicaine, 20 cc 0.5% lidocaine with 1:200,000 epinephrine, 15 cc preservative free normal saline     MODIFIER(S): None    COMPLICATIONS: None apparent    INDICATIONS FOR PROCEDURE: This patient has a history of progressive right hip pain and arthritis. The hip pain is severe with activity and has progressed significantly. Non-operative treatment has been attempted, but has not improved or controlled symptoms during normal daily activities. Motion has become limited and rotation severely restricted. X-rays reveal moderate-to-severe eburnation of articular cartilage on the superior weight bearing surface of the hip with circumferential acetabular and femoral neck osteophytes  consistent with advanced hip osteoarthritis. A total hip arthroplasty was recommended at this time. The risks, benefits, alternatives, and potential complications of the arthroplasty surgery were discussed with the patient in detail to include but not limited to infection, bleeding, anesthesia risks, sciatic nerve palsy, instability/dislocation, limb length discrepancy, aseptic loosening, osteolysis, blood clots, continued pain, iatrogenic fracture, myocardial infarction, stroke, and death. Specific details of the procedure, hospitalization, recovery, rehabilitation, and long-term precautions were also provided. Pre-operative teaching was provided. Implant/prosthesis selection was outlined, and the many options available were explained; the final choice will be made at the time of the procedure to match the anatomy and condition of the bone, ligaments, tendons, and muscles. Understanding of all topics was conveyed to me by the patient, and consent was given to proceed with a right total hip arthroplasty. The patient completed preoperative medical optimization and risk assessment, joint arthroplasty education, and MRSA decolonization using a universal decolonization protocol. Perioperative blood management and the potential for blood transfusion were discussed with risks and options clearly outlined.     INTRAOPERATIVE FINDINGS: Moderate osteoarthritis right hip    PROCEDURE: The patient was identified in the preoperative holding area. The operative site was confirmed and marked. A sequential compression device was placed on the nonoperative leg. The risks, benefits, and alternatives to surgery were again confirmed with the patient and the patient wished to proceed. The patient was brought to the operating room and placed on the operating room table in the supine position. A huddle was performed with the patient and all vital surgical team members to confirm the correct operative site, procedure, anesthesia type, and  operative plan with the patient. After anesthesia was performed, the patient was positioned in the lateral decubitus position on the pegboard and secured with the operative side up. An axillary roll was placed in the axilla and all bony prominences and pressure points were checked and padded. A relative leg length assessment was carried out and markers were placed for intraoperative assessment. Intravenous antibiotic prophylaxis was given and confirmed with the anesthesia team.     The operative leg was prepped and draped in the usual sterile fashion. A surgical time out was performed immediately preceding the incision with all personnel in the operating room to again confirm patient identity, the correct operative site and extremity, correct radiographic studies, availability of appropriate surgical equipment and agreement on the planned procedure. A posterolateral approach to the hip was performed through an incision centered over the greater trochanter. The incision was carried through the subcutaneous tissue to the underlying fascia mendel and gluteus joanne fascia, which were incised and split posteriorly over the trochanter in the direction of the fibers. Hemostasis was obtained with electrocautery. The Charnley retractor was placed after carefully palpating the sciatic nerve which was protected throughout the case.     A standard posterior approach to the hip was performed by releasing the piriformis, short external rotators and posterior capsule and reflecting them posteriorly as a rectangular flap. The superior capsule was scarred down; it was released and excised. The labrum was split and the femoral head mobilized. The hip was then flexed, internally rotated and dislocated from the acetabulum without excessive force. Assessment of the femoral head revealed eburnation of the articular cartilage with complete loss of the weight bearing chondral surface. Osteophytes were present as well. Careful measurements  were performed using the center of the femoral head and the lesser trochanter as markers and a femoral neck cut was made according to the preoperative plan.     Attention was then turned to the acetabulum. Retractors were placed circumferentially for wide acetabular exposure. The labrum and osteophytes were debrided from the rim, and the medial wall was identified and the depth of the socket assessed by excising the pulvinar. Bleeders were controlled, especially the area of the obturator artery with the electrocautery. Acetabular reaming was then started with the hemispherical instrument matching the size of the excised femoral head. Sequential reaming of the acetabulum was then performed by increasing size in 2 mm increments.  Reaming was performed line to line. The reamers created an excellent hemispherical bed of bleeding cancellous bone. The cup was impacted into position, targeting 40-45 degrees of abduction and 20-25 degrees of anteversion, with an excellent press-fit. The press-fit was firm, stable, and apically seated.  2 screw(s) were used for additional support of the fixation. Further osteophyte debridement was done around the socket. All impinging soft tissue was removed from the edges of the socket. The polyethylene bearing/liner was then impacted into place and checked for stability.     Attention was then turned to the femur. The leg was positioned so access did not result in soft-tissue injury. The femoral preparation was started with a box osteotome. The medullary cavity of the femur was then entered and opened with hand reamers. Femoral stem broaches were then employed in an incremental fashion up to the final size, targeting 15-20 degrees of anteversion. The final broach was fully seated, had good rotational and axial stability, and was seated at the appropriate height in relation to the greater trochanter and the preoperative plan. Trial reduction was done. Excellent stability and range of motion  was achieved without impingement at any position. The hip was stable in full extension and external rotation as well as in flexion past 90 degrees, 20 degrees adduction and 60-70 degrees of internal rotation. Leg lengths were re-created within millimeters based on the markers and relative measurement. The hip was then dislocated and the trials removed. The wound was copiously irrigated, and the permanent femoral stem was then impacted down in approximately 15-20 degrees of anteversion. The press-fit was firm, and stable to axial and rotational force in all planes. The permanent femoral head was then impacted on the clean trunnion. The socket and wound were irrigated, suctioned, and inspected for debris. The final reduction was performed, and again leg length assessment and stability assessment of the hip were performed to confirm optimal component selection and stability in all planes without impingement when stressed to the extremes.     The wound was irrigated with dilute betadine solution as well as saline, and hemostasis obtained with electrocautery. A pain cocktail was injected into the pericapsular tissues. The posterior capsule, piriformis, and short external rotators were repaired utilizing #2 Ticron through drill holes in the greater trochanter. The sciatic nerve was palpated and found to be intact and the wound was irrigated. Instrument and sponge counts were completed and confirmed correct. The fascia mendel and gluteal fascia were closed with interrupted #1 Vicryl suture and oversewn with #2 Stratafix. The deep subcutaneous tissue was closed with running #1 Stratafix suture and the superficial subcutaneous tissue with interrupted 2-0 Vicryl suture. A 3-0 monocryl running stitch was used to close skin followed by skin glue adhesive to seal the wound. A silver impregnated dressing was then placed, followed by a sequential compression device to the operative limb, followed by an abduction pillow. The patient  was then returned to a supine position on the operating room table. The patient was sufficiently recovered from anesthesia, transferred to a hospital bed and taken to the PACU in stable condition.     One gram (1000 mg) of intravenous tranexamic acid was administered prior to incision. A second one gram (1000 mg) intravenous dose was given prior to wound closure.    No apparent complications occurred during the procedure Instrument, sponge and needle counts were correct x 2.     The patient underwent risk stratification preoperatively and aspirin was chosen for DVT prophylaxis. Delay in starting chemical prophylaxis for 23 hours from surgical incision was over concerns for hematoma formation and wound related issues.     POST OPERATIVE PLAN:   Protected weight bearing as tolerated   Posterior hip precautions x 6 weeks   PT/OT for mobilization and medical equipment needs   23 hours perioperative antibiotic prophylaxis   Pain control with PO/IV meds   Keep silver dressing in place for 7 days post op. Change dressing only if saturated.   SCD's to bilateral lower extremities   Social work for discharge planning needs   Follow up in 3 weeks for post operative wound check with XR AP pelvis.

## 2023-09-11 NOTE — BRIEF OP NOTE
TOTAL HIP ARTHROPLASTY  Progress Note    Kelsi Costa  9/11/2023    Pre-op Diagnosis:   Primary osteoarthritis of right hip [M16.11]       Post-Op Diagnosis Codes:     * Primary osteoarthritis of right hip [M16.11]    Procedure/CPT® Codes:  MA ARTHRP ACETBLR/PROX FEM PROSTC AGRFT/ALGRFT [10844]      Procedure(s):  TOTAL HIP ARTHROPLASTY - RIGHT    Surgical Approach: Hip Posterior            Surgeon(s):  Alin Lewis MD    Anesthesia: General    Staff:   Circulator: Gucci Joy RN; Jayant Echols RN; Elsie Hoskins RN  Radiology Technologist: Melissa Saez  Scrub Person: Gardenia Lunsford  Vendor Representative: Meek Cline Assistant: Kevin Godfrey PCT  Assistant: Wero Tello PA-C  Assistant: Wero Tello PA-C      Estimated Blood Loss: 250 mL    Urine Voided: * No values recorded between 9/11/2023 10:15 AM and 9/11/2023 11:48 AM *    Specimens:                None          Drains: * No LDAs found *    Findings: Moderate osteoarthritis right hip        Complications: None apparent    Assistant: Wero Tello PA-C  was responsible for performing the following activities: Retraction, Suction, Irrigation, Suturing, Closing, and Placing Dressing and their skilled assistance was necessary for the success of this case.    Alin Lewis MD     Date: 9/11/2023  Time: 12:14 EDT

## 2023-09-11 NOTE — PLAN OF CARE
Goal Outcome Evaluation:  Plan of Care Reviewed With: patient, spouse        Progress: no change  Outcome Evaluation: OT evaluation completed. Reviewed posterior hip precautions. Provided and reviewed ADL AE. Pt performed LBD with SBA and VCs, completing in room ambulation for toileting using a RWx with CGA. Pt demonstrated the ability to safely complete household mobility and complete ADLs with little assist. Pt also notes good family support at home. Pt appears safe for a d/c home with family support and OPPT, however should pt remain in hospital OT will follow up to ensure safety and independence in ADLs.      Anticipated Discharge Disposition (OT): home with assist

## 2023-09-11 NOTE — THERAPY EVALUATION
Patient Name: Kelsi Costa  : 1970    MRN: [id number]                              Today's Date: 2023       Admit Date: 2023    Visit Dx:     ICD-10-CM ICD-9-CM   1. Status post total hip replacement, right  Z96.641 V43.64   2. Primary osteoarthritis of right hip  M16.11 715.15     Patient Active Problem List   Diagnosis    Obstructive sleep apnea    Periodic headache syndrome, not intractable    Hip arthritis    Status post total hip replacement, right    Chronic pain    DM2 (diabetes mellitus, type 2)    MARY on CPAP    Obesity (BMI 30-39.9)     Past Medical History:   Diagnosis Date    Angina at rest     Arthritis of neck     Asthma     Back pain     Bursitis of hip     Cervical disc disorder     Diabetes mellitus     Elevated cholesterol     Fibromyalgia     Headache     Hip arthrosis     Hyperlipidemia     Hypertension     Knee swelling     Low back strain     Lumbosacral disc disease     Mitral valve prolapse     Sleep apnea     CPAP compliant, auto pressure adjustment per patient    Tear of meniscus of knee     Tendinitis of knee     Vertigo     Wears glasses      Past Surgical History:   Procedure Laterality Date    CARDIAC CATHETERIZATION N/A 2017    Procedure: Left Heart Cath;  Surgeon: Darnell Allen MD;  Location: [facility] CATH INVASIVE LOCATION;  Service:      SECTION      COLONOSCOPY      ESSURE TUBAL LIGATION      FEMORAL ARTERY REPAIR Right     HYSTERECTOMY      JOINT REPLACEMENT Bilateral     L knee  R knee ?     KNEE ARTHROPLASTY, PARTIAL REPLACEMENT Left     TOTAL KNEE ARTHROPLASTY Right     TRIGGER POINT INJECTION      May 2022 in R hip      General Information       Row Name 23 1523          Physical Therapy Time and Intention    Document Type evaluation  -HP     Mode of Treatment physical therapy  -       Row Name 23 1523          General Information    Patient Profile Reviewed yes  -HP      Prior Level of Function min assist:;all household mobility;community mobility;gait;transfer;bed mobility;ADL's  -     Existing Precautions/Restrictions fall;hip, posterior  -     Barriers to Rehab none identified  -       Row Name 09/11/23 1523          Living Environment    People in Home child(rianna), dependent;other (see comments)  lives with son who has down syndrome; dtr plans on assisting pt at d/c  -       Row Name 09/11/23 1523          Home Main Entrance    Number of Stairs, Main Entrance none  -       Row Name 09/11/23 1523          Stairs Within Home, Primary    Number of Stairs, Within Home, Primary none  -       Row Name 09/11/23 1523          Cognition    Orientation Status (Cognition) oriented x 3  -       Row Name 09/11/23 1523          Safety Issues, Functional Mobility    Safety Issues Affecting Function (Mobility) insight into deficits/self-awareness;awareness of need for assistance;safety precaution awareness  -     Impairments Affecting Function (Mobility) balance;endurance/activity tolerance;pain;strength  -               User Key  (r) = Recorded By, (t) = Taken By, (c) = Cosigned By      Initials Name Provider Type     Kinga Fountain, PT Physical Therapist                   Mobility       Row Name 09/11/23 1524          Bed Mobility    Bed Mobility supine-sit  -     Supine-Sit Lyon (Bed Mobility) minimum assist (75% patient effort)  -     Assistive Device (Bed Mobility) bed rails;head of bed elevated  -     Comment, (Bed Mobility) VC for sequencing with Min A for R LE management. Increased time and effort to complete task.  -       Row Name 09/11/23 1524          Transfers    Comment, (Transfers) VC for hand placement to push from bed and keep staggered stance with R foot in front to maintain hip precautions  -       Row Name 09/11/23 1524          Sit-Stand Transfer    Sit-Stand Lyon (Transfers) contact guard;2 person assist  -     Assistive  Device (Sit-Stand Transfers) walker, front-wheeled  -       Row Name 09/11/23 1524 09/11/23 1449       Gait/Stairs (Locomotion)    Russell Level (Gait) contact guard;2 person assist  - --    Assistive Device (Gait) walker, front-wheeled  - --    Patient was able to Ambulate yes  -HP yes  -    Distance in Feet (Gait) 300  - 300  -HP    Deviations/Abnormal Patterns (Gait) bilateral deviations;base of support, wide;weight shifting decreased;stride length decreased;gait speed decreased  - --    Bilateral Gait Deviations forward flexed posture;heel strike decreased  - --    Comment, (Gait/Stairs) Pt amb in tracy with  step-to progressing to step-through gait pattern. VC for increased weight acceptance onto RLE, decreased BUE reliance, increased step length, and upright posture. Good improvement with VC. Slow gait speed noted. No knee buckling or LOB noted. Activity limited by fatigue.  - --      [facility] Name 09/11/23 1524          Mobility    Extremity Weight-bearing Status right lower extremity  -     Right Lower Extremity (Weight-bearing Status) weight-bearing as tolerated (WBAT)  -               User Key  (r) = Recorded By, (t) = Taken By, (c) = Cosigned By      Initials Name Provider Type     Kinga Fountain, PT Physical Therapist                   Obj/Interventions       [facility] Name 09/11/23 1549          Range of Motion Comprehensive    General Range of Motion no range of motion deficits identified  -[facility] Name 09/11/23 1549          Strength Comprehensive (MMT)    General Manual Muscle Testing (MMT) Assessment lower extremity strength deficits identified  -     Comment, General Manual Muscle Testing (MMT) Assessment Pt able to perform B active ankle DF and SLR  -[facility] Name 09/11/23 1549          Motor Skills    Therapeutic Exercise hip;knee;ankle  -[facility] Name 09/11/23 1549          Hip (Therapeutic Exercise)    Hip (Therapeutic Exercise) AROM (active range of motion)  -      Hip AROM (Therapeutic Exercise) right;aBduction;flexion;3 repetitions  -[facility] Name 09/11/23 1549          Knee (Therapeutic Exercise)    Knee (Therapeutic Exercise) isometric exercises;strengthening exercise  -     Knee Isometrics (Therapeutic Exercise) right;quad sets;5 repetitions  -     Knee Strengthening (Therapeutic Exercise) right;SLR (straight leg raise);LAQ (long arc quad);heel slides;3 repetitions  -[facility] Name 09/11/23 1549          Ankle (Therapeutic Exercise)    Ankle (Therapeutic Exercise) AROM (active range of motion)  -     Ankle AROM (Therapeutic Exercise) bilateral;dorsiflexion;plantarflexion;10 repetitions  -[facility] Name 09/11/23 1549          Balance    Balance Assessment sitting static balance;sitting dynamic balance;sit to stand dynamic balance;standing dynamic balance;standing static balance  -     Static Sitting Balance standby assist  -     Dynamic Sitting Balance standby assist  -     Position, Sitting Balance sitting edge of bed  -     Static Standing Balance contact guard  -     Dynamic Standing Balance contact guard  -     Position/Device Used, Standing Balance supported;walker, rolling  -     Balance Interventions sitting;standing;sit to stand;occupation based/functional task  -[facility] Name 09/11/23 1549          Sensory Assessment (Somatosensory)    Sensory Assessment (Somatosensory) left-sided sensation intact  -               User Key  (r) = Recorded By, (t) = Taken By, (c) = Cosigned By      Initials Name Provider Type     Kinga Fountain, PT Physical Therapist                   Goals/Plan       [facility] Name 09/11/23 1551          Bed Mobility Goal 1 (PT)    Activity/Assistive Device (Bed Mobility Goal 1, PT) sit to supine/supine to sit  -     Todd Level/Cues Needed (Bed Mobility Goal 1, PT) modified independence  -     Time Frame (Bed Mobility Goal 1, PT) long term goal (LTG);3 days  -     Progress/Outcomes (Bed Mobility  Goal 1, PT) goal ongoing  -       Row Name 09/11/23 1557          Transfer Goal 1 (PT)    Activity/Assistive Device (Transfer Goal 1, PT) sit-to-stand/stand-to-sit  -HP     [place] Level/Cues Needed (Transfer Goal 1, PT) modified independence  -HP     Time Frame (Transfer Goal 1, PT) long term goal (LTG);3 days  -HP     Progress/Outcome (Transfer Goal 1, PT) goal ongoing  -       Row Name 09/11/23 1557          Gait Training Goal 1 (PT)    Activity/Assistive Device (Gait Training Goal 1, PT) gait (walking locomotion)  -HP     [place] Level (Gait Training Goal 1, PT) modified independence  -HP     Distance (Gait Training Goal 1, PT) 500  -HP     Time Frame (Gait Training Goal 1, PT) long term goal (LTG);3 days  -HP     Progress/Outcome (Gait Training Goal 1, PT) goal ongoing  -       Row Name 09/11/23 1557          Therapy Assessment/Plan (PT)    Planned Therapy Interventions (PT) balance training;bed mobility training;gait training;home exercise program;patient/family education;transfer training;stretching;strengthening;ROM (range of motion)  -               User Key  (r) = Recorded By, (t) = Taken By, (c) = Cosigned By      Initials Name Provider Type     Kinga Fountain, PT Physical Therapist                   Clinical Impression       [facility] Name 09/11/23 1553          Pain    Pretreatment Pain Rating 4/10  -HP     Posttreatment Pain Rating 4/10  -     Pain Location - Side/Orientation Right  -     Pain Location incisional  -     Pain Location - hip  -HP     Pain Intervention(s) Repositioned;Cold applied;Ambulation/increased activity;Elevated  -       Row Name 09/11/23 8773          Plan of Care Review    Plan of Care Reviewed With patient;spouse  -HP     Progress no change  -HP     Outcome Evaluation Pt amb in tracy with FWW and CGAx2. No knee buckling or LOB noted. Slow gait speed and heavy reliance on FWW noted. HEP and precautions reviewed. Recommend d/c home with assist and OPPT. Pt  cleared to d/c today from PT standpoint. Pt will need FWW prior to d/c  -       Row Name 09/11/23 1555          Therapy Assessment/Plan (PT)    Rehab Potential (PT) good, to achieve stated therapy goals  -     Criteria for Skilled Interventions Met (PT) yes;meets criteria;skilled treatment is necessary  -     Therapy Frequency (PT) 2 times/day  -       Row Name 09/11/23 1553          Vital Signs    Pre Patient Position Supine  -HP     Intra Patient Position Standing  -HP     Post Patient Position Sitting  -       Row Name 09/11/23 1552          Positioning and Restraints    Pre-Treatment Position in bed  -HP     Post Treatment Position chair  -HP     In Chair notified nsg;reclined;sitting;call light within reach;encouraged to call for assist;exit alarm on;with family/caregiver;legs elevated;compression device  -               User Key  (r) = Recorded By, (t) = Taken By, (c) = Cosigned By      Initials Name Provider Type     Kinga Fountain, PT Physical Therapist                   Outcome Measures       Row Name 09/11/23 3562          How much help from another person do you currently need...    Turning from your back to your side while in flat bed without using bedrails? 3  -HP     Moving from lying on back to sitting on the side of a flat bed without bedrails? 3  -HP     Moving to and from a bed to a chair (including a wheelchair)? 3  -HP     Standing up from a chair using your arms (e.g., wheelchair, bedside chair)? 3  -HP     Climbing 3-5 steps with a railing? 3  -HP     To walk in hospital room? 3  -HP     AM-PAC 6 Clicks Score (PT) 18  -HP     Highest level of mobility 6 --> Walked 10 steps or more  -       Row Name 09/11/23 4523          PADD    Diagnosis 2  -HP     Gender 1  -HP     Age Group 2  -HP     Gait Distance 1  -HP     Assist Level 1  -HP     Home Support 3  -HP     PADD Score 10  -HP     Patient Preference home with outpatient rehab  -HP     Prediction by PADD Score directly home  (with home health or out-patient rehab)  -       Row Name 09/11/23 1557 09/11/23 1548       Functional Assessment    Outcome Measure Options AM-PAC 6 Clicks Basic Mobility (PT);PADD  - AM-PAC 6 Clicks Daily Activity (OT)  -KF              User Key  (r) = Recorded By, (t) = Taken By, (c) = Cosigned By      Initials Name Provider Type     Kinga Fountain, PT Physical Therapist    KF Clary Wilburn, OT Occupational Therapist                                 Physical Therapy Education       Title: PT OT SLP Therapies (In Progress)       Topic: Physical Therapy (Done)       Point: Mobility training (Done)       Learning Progress Summary             Patient Acceptance, E,D, VU,DU by  at 9/11/2023 1559                         Point: Home exercise program (Done)       Learning Progress Summary             Patient Acceptance, E,D, VU,DU by  at 9/11/2023 1559                         Point: Body mechanics (Done)       Learning Progress Summary             Patient Acceptance, E,D, VU,DU by  at 9/11/2023 1559                         Point: Precautions (Done)       Learning Progress Summary             Patient Acceptance, E,D, VU,DU by  at 9/11/2023 1559                                         User Key       Initials Effective Dates Name Provider Type Discipline     06/01/21 -  Kinga Fountain, PT Physical Therapist PT                  PT Recommendation and Plan  Planned Therapy Interventions (PT): balance training, bed mobility training, gait training, home exercise program, patient/family education, transfer training, stretching, strengthening, ROM (range of motion)  Plan of Care Reviewed With: patient, spouse  Progress: no change  Outcome Evaluation: Pt amb in tracy with FWW and CGAx2. No knee buckling or LOB noted. Slow gait speed and heavy reliance on FWW noted. HEP and precautions reviewed. Recommend d/c home with assist and OPPT. Pt cleared to d/c today from PT standpoint. Pt will need FWW prior to d/c      Time Calculation:   PT Evaluation Complexity  History, PT Evaluation Complexity: 1-2 personal factors and/or comorbidities  Examination of Body Systems (PT Eval Complexity): total of 3 or more elements  Clinical Presentation (PT Evaluation Complexity): stable  Clinical Decision Making (PT Evaluation Complexity): low complexity  Overall Complexity (PT Evaluation Complexity): low complexity     PT Charges       Row Name 09/11/23 1418             Time Calculation    Start Time 1418  -HP      PT Received On 09/11/23  -HP      PT Goal Re-Cert Due Date 09/21/23  -HP         Timed Charges    69163 - Gait Training Minutes  10  -HP         Untimed Charges    PT Eval/Re-eval Minutes 55  -HP         Total Minutes    Timed Charges Total Minutes 10  -HP      Untimed Charges Total Minutes 55  -HP       Total Minutes 65  -HP                User Key  (r) = Recorded By, (t) = Taken By, (c) = Cosigned By      Initials Name Provider Type    HP Kinga Fountain, HE Physical Therapist                  Therapy Charges for Today       Code Description Service Date Service Provider Modifiers Qty    30307035167 HC GAIT TRAINING EA 15 MIN 9/11/2023 Kinga Fountain, PT GP 1    52911784461 HC PT EVAL LOW COMPLEXITY 4 9/11/2023 Kinga Fountain, PT GP 1    40875911602 HC PT THER SUPP EA 15 MIN 9/11/2023 Kinga Fountain, PT GP 3            PT G-Codes  Outcome Measure Options: AM-PAC 6 Clicks Basic Mobility (PT), PADD  AM-PAC 6 Clicks Score (PT): 18  AM-PAC 6 Clicks Score (OT): 21  PT Discharge Summary  Anticipated Discharge Disposition (PT): home with assist, home with outpatient therapy services    Kinga Fountain PT  9/11/2023

## 2023-09-11 NOTE — CASE MANAGEMENT/SOCIAL WORK
"Discharge Planning Assessment  [facility]     Patient Name: Kelsi Costa  MRN: [id number]  Today's Date: 9/11/2023    Admit Date: 9/11/2023    Plan: Home with family assistance, [facility] and a rolling walker from [employer]                   Discharge Plan       Row Name 09/11/23 1613       Plan    Plan Home with family assistance, [facility] and a rolling walker from [employer]    Patient/Family in Agreement with Plan yes    Plan Comments Met with Ms. Costa, her daughter, Ophelia and friend, Darnell, at the bedside, for discharge planning.    Ms. Costa lives alone in [place]. She has been evaluated by PT and per notes, \"Pt amb in tracy with FWW and CGAx2. No knee buckling or LOB noted. Slow gait speed and heavy reliance on FWW noted. HEP and precautions reviewed. Recommend d/c home with assist and OPPT. Pt cleared to d/c today from PT standpoint. Pt will need FWW prior to d/c.\"    The patient requested a rolling walker for home use and did not have preference for provider.  CM delivered a walker to the hospital room.  Ms. Costa also has a bedside commode, shower chair and hip kit at home.    Discussed physical therapy and Ms. Costa was agreeable to [facility].  Referral given to Becki with [facility] will contact the patient to schedule her first home appointment.    PCP is Blank Mckeon.  Insurance is [employer] and t [employer] with no interruption in coverage.    [place] plan is to return home with her family to assist her as needed.  The family will be transporting Ms. Costa home when discharged.    CM will continue to follow.    Final Discharge Disposition Code 01 - home or self-care                  Continued Care and Services - Admitted Since 9/11/2023    Coordination has not been started for this encounter.                      Marah Mustafa RN    "

## 2023-09-11 NOTE — INTERVAL H&P NOTE
[facility] Pre-op    Full history and physical note from office is attached.    /89 (BP Location: Right arm, Patient Position: Sitting)   Pulse 74   Temp 97.7 °F (36.5 °C) (Temporal)   Resp 18   SpO2 100%     LAB Results:  Lab Results   Component Value Date    WBC 6.21 08/28/2023    HGB 12.7 08/28/2023    HCT 39.6 08/28/2023    MCV 88.4 08/28/2023     08/28/2023    NEUTROABS 2.76 08/28/2023    GLUCOSE 111 (H) 08/28/2023    BUN 6 08/28/2023    CREATININE 0.98 08/28/2023    EGFRIFAFRI 91 07/29/2019     08/28/2023    K 3.3 (L) 08/28/2023    CL 97 (L) 08/28/2023    CO2 31.0 (H) 08/28/2023    CALCIUM 9.3 08/28/2023    ALBUMIN 4.1 08/28/2023    AST 21 08/28/2023    ALT 15 08/28/2023    BILITOT 0.3 08/28/2023    PTT 31.0 08/28/2023    INR 1.00 08/28/2023 8/17/23 hip xray:  Study Result    Narrative & Impression   Indication: Right hip pain     Comparison: Todays xrays were compared to previous xrays from 1/27/2023    AP pelvis: Right: moderate joint space narrowing,  there are marginal osteophytes visualized at the femoral head-neck junction and acetabular margins and No significant changes compared to prior radiographs.;Left: mild joint space narrowing, there are marginal osteophytes visualized at the femoral head-neck junction and acetabular margins and No significant changes compared to prior radiographs.       Cancer Staging (if applicable)  Cancer Patient: __ yes __no __unknown__N/A; If yes, clinical stage T:__ N:__M:__, stage group or __N/A      Impression: Primary osteoarthritis of right hip      Plan: TOTAL HIP ARTHROPLASTY - RIGHT      Fatmata Briggs, JUAN   9/11/2023   09:21 EDT

## 2023-09-11 NOTE — ANESTHESIA PROCEDURE NOTES
Airway  Urgency: elective    Date/Time: 9/11/2023 10:28 AM  Airway not difficult    General Information and Staff    Patient location during procedure: OR    Indications and Patient Condition  Indications for airway management: airway protection    Preoxygenated: yes  MILS not maintained throughout  Mask difficulty assessment: 1 - vent by mask    Final Airway Details  Final airway type: endotracheal airway      Successful airway: ETT  Cuffed: yes   Successful intubation technique: direct laryngoscopy  Endotracheal tube insertion site: oral  Blade: Elisha  Blade size: 3  ETT size (mm): 7.0  Cormack-Lehane Classification: grade IIa - partial view of glottis  Placement verified by: chest auscultation and capnometry   Cuff volume (mL): 8  Measured from: lips  ETT/EBT  to lips (cm): 20  Number of attempts at approach: 1  Assessment: lips, teeth, and gum same as pre-op and atraumatic intubation    Additional Comments  Negative epigastric sounds, Breath sound equal bilaterally with symmetric chest rise and fall

## 2023-09-11 NOTE — THERAPY EVALUATION
Patient Name: Kelsi Costa  : 1970    MRN: [id number]                              Today's Date: 2023       Admit Date: 2023    Visit Dx:     ICD-10-CM ICD-9-CM   1. Status post total hip replacement, right  Z96.641 V43.64   2. Primary osteoarthritis of right hip  M16.11 715.15     Patient Active Problem List   Diagnosis    Obstructive sleep apnea    Periodic headache syndrome, not intractable    Hip arthritis    Status post total hip replacement, right    Chronic pain    DM2 (diabetes mellitus, type 2)    MARY on CPAP    Obesity (BMI 30-39.9)     Past Medical History:   Diagnosis Date    Angina at rest     Arthritis of neck     Asthma     Back pain     Bursitis of hip     Cervical disc disorder     Diabetes mellitus     Elevated cholesterol     Fibromyalgia     Headache     Hip arthrosis     Hyperlipidemia     Hypertension     Knee swelling     Low back strain     Lumbosacral disc disease     Mitral valve prolapse     Sleep apnea     CPAP compliant, auto pressure adjustment per patient    Tear of meniscus of knee     Tendinitis of knee     Vertigo     Wears glasses      Past Surgical History:   Procedure Laterality Date    CARDIAC CATHETERIZATION N/A 2017    Procedure: Left Heart Cath;  Surgeon: Darnell Allen MD;  Location: [facility] CATH INVASIVE LOCATION;  Service:      SECTION      COLONOSCOPY      ESSURE TUBAL LIGATION      FEMORAL ARTERY REPAIR Right     HYSTERECTOMY      JOINT REPLACEMENT Bilateral     L knee  R knee ? 2008    KNEE ARTHROPLASTY, PARTIAL REPLACEMENT Left     TOTAL KNEE ARTHROPLASTY Right     TRIGGER POINT INJECTION      May 2022 in R hip      General Information       Row Name 23 1538          OT Time and Intention    Document Type evaluation  -KF     Mode of Treatment occupational therapy;individual therapy  -KF       Row Name 23 1538          General Information    Patient Profile Reviewed  yes  -KF     Prior Level of Function min assist:;all household mobility;community mobility;bed mobility;ADL's  -KF     Existing Precautions/Restrictions fall;hip, posterior  -KF     Barriers to Rehab none identified  -       Row Name 09/11/23 1538          Occupational Profile    Environmental Supports and Barriers (Occupational Profile) Pt has a walk in shower with no seat available and a tub shower with a seat option. Pt has a standard height commode.  -       Row Name 09/11/23 1538          Living Environment    People in Home child(rianna), dependent;spouse;other (see comments)  Adult daughter plans on assisting during recovery.  -       Row Name 09/11/23 1538          Home Main Entrance    Number of Stairs, Main Entrance none  -       Row Name 09/11/23 1538          Stairs Within Home, Primary    Number of Stairs, Within Home, Primary none  -       Row Name 09/11/23 1538          Cognition    Orientation Status (Cognition) oriented x 4  -       Row Name 09/11/23 1538          Safety Issues, Functional Mobility    Safety Issues Affecting Function (Mobility) insight into deficits/self-awareness;awareness of need for assistance;safety precaution awareness  -KF     Impairments Affecting Function (Mobility) balance;endurance/activity tolerance;pain;strength  -KF               User Key  (r) = Recorded By, (t) = Taken By, (c) = Cosigned By      Initials Name Provider Type    KF Clary Wilburn OT Occupational Therapist                     Mobility/ADL's       Row Name 09/11/23 1540          Bed Mobility    Comment, (Bed Mobility) Pt found and left up in the chair.  -       Row Name 09/11/23 1540          Transfers    Transfers sit-stand transfer;stand-sit transfer;toilet transfer  -       Row Name 09/11/23 1540          Sit-Stand Transfer    Sit-Stand [place] (Transfers) contact guard;verbal cues  -     Assistive Device (Sit-Stand Transfers) walker, front-wheeled  -       Row Name 09/11/23 1540           Stand-Sit Transfer    Stand-Sit [place] (Transfers) contact guard;verbal cues  -KF     Assistive Device (Stand-Sit Transfers) walker, front-wheeled  -[facility] Name 09/11/23 1540          Toilet Transfer    Type (Toilet Transfer) sit-stand;stand-sit  -KF     [place] Level (Toilet Transfer) contact guard;verbal cues  -KF     Assistive Device (Toilet Transfer) walker, front-wheeled  -     Comment, (Toilet Transfer) VCs to maintain posterior hip precautions during sit/stand  -KF       Row Name 09/11/23 1540          Functional Mobility    Functional Mobility- Ind. Level contact guard assist  -     Functional Mobility- Device walker, front-wheeled  -     Functional Mobility-Distance (Feet) --  In room ambulation for ADLs  -     Functional Mobility- Comment Pt with heavy relies on UEs and RWx to assist in pain control during mobility  -KF       Row Name 09/11/23 1540          Activities of Daily Living    BADL Assessment/Intervention bathing;lower body dressing;toileting  -KF       Row Name 09/11/23 1540          Mobility    Extremity Weight-bearing Status right lower extremity  -     Right Lower Extremity (Weight-bearing Status) weight-bearing as tolerated (WBAT)  -[facility] Name 09/11/23 1540          Bathing Assessment/Intervention    Assistive Devices (Bathing) long-handled sponge  -     Comment, (Bathing) Provided and educated on use of a long handled sponge to assist in bathing  -KF       Row Name 09/11/23 1540          Lower Body Dressing Assessment/Training    [place] Level (Lower Body Dressing) doff;socks;don;pants/bottoms;shoes/slippers;standby assist;verbal cues  -     Assistive Devices (Lower Body Dressing) long-handled shoe horn;reacher;sock-aid  -     Position (Lower Body Dressing) unsupported sitting  -     Comment, (Lower Body Dressing) Provided, educated on, and practiced with ADL AE including a long handled shoe horn, reacher, and sock aid  -[facility]  Name 09/11/23 1540          Toileting Assessment/Training    [place] Level (Toileting) toileting skills;adjust/manage clothing;perform perineal hygiene;standby assist;verbal cues  -KF     Position (Toileting) unsupported sitting;supported standing  -KF     Comment, (Toileting) VCs to maintain posterior hip precautions during hygiene  -KF               User Key  (r) = Recorded By, (t) = Taken By, (c) = Cosigned By      Initials Name Provider Type    KF Clary Wilburn OT Occupational Therapist                   Obj/Interventions       Row Name 09/11/23 1543          Sensory Assessment (Somatosensory)    Sensory Assessment (Somatosensory) UE sensation intact  -       Row Name 09/11/23 1543          Vision Assessment/Intervention    Visual Impairment/Limitations WNL  -       Row Name 09/11/23 1543          Range of Motion Comprehensive    General Range of Motion bilateral upper extremity ROM WNL  -       Row Name 09/11/23 1543          Strength Comprehensive (MMT)    Comment, General Manual Muscle Testing (MMT) Assessment No BUE deficits identified  -       Row Name 09/11/23 1543          Balance    Balance Assessment sitting static balance;sitting dynamic balance;standing static balance;standing dynamic balance  -KF     Static Sitting Balance independent  -KF     Dynamic Sitting Balance standby assist  -KF     Position, Sitting Balance unsupported;sitting in chair  -KF     Static Standing Balance contact guard  -KF     Dynamic Standing Balance contact guard  -KF     Position/Device Used, Standing Balance supported;walker, front-wheeled  -KF     Balance Interventions sitting;standing;supported;static;dynamic;occupation based/functional task  -KF     Comment, Balance No overt LOB  -KF               User Key  (r) = Recorded By, (t) = Taken By, (c) = Cosigned By      Initials Name Provider Type    KF Clary Wilburn OT Occupational Therapist                   Goals/Plan       Row Name 09/11/23 1546           Transfer Goal 1 (OT)    Activity/Assistive Device (Transfer Goal 1, OT) toilet  -KF     [place] Level/Cues Needed (Transfer Goal 1, OT) modified independence  -KF     Time Frame (Transfer Goal 1, OT) long term goal (LTG);10 days  -KF     Progress/Outcome (Transfer Goal 1, OT) new goal  -KF       Row Name 09/11/23 1548          Dressing Goal 1 (OT)    Activity/Device (Dressing Goal 1, OT) lower body dressing  -KF     [place]/Cues Needed (Dressing Goal 1, OT) modified independence  -KF     Time Frame (Dressing Goal 1, OT) long term goal (LTG);10 days  -KF     Progress/Outcome (Dressing Goal 1, OT) new goal  -KF       Row Name 09/11/23 1548          Toileting Goal 1 (OT)    Activity/Device (Toileting Goal 1, OT) adjust/manage clothing;perform perineal hygiene  -KF     [place] Level/Cues Needed (Toileting Goal 1, OT) modified independence  -KF     Time Frame (Toileting Goal 1, OT) long term goal (LTG);10 days  -KF     Progress/Outcome (Toileting Goal 1, OT) new goal  -KF               User Key  (r) = Recorded By, (t) = Taken By, (c) = Cosigned By      Initials Name Provider Type    KF Clary Wilburn OT Occupational Therapist                   Clinical Impression       Row Name 09/11/23 1548          Pain Assessment    Pretreatment Pain Rating 5/10  -KF     Posttreatment Pain Rating 4/10  -KF     Pain Location - Side/Orientation Right  -KF     Pain Location generalized  -KF     Pain Location - hip  -KF     Pain Intervention(s) Repositioned;Ambulation/increased activity;Rest  -KF       Row Name 09/11/23 1540          Plan of Care Review    Plan of Care Reviewed With patient;spouse  -KF     Progress no change  -KF     Outcome Evaluation OT evaluation completed. Reviewed posterior hip precautions. Provided and reviewed ADL AE. Pt performed LBD with SBA and VCs, completing in room ambulation for toileting using a RWx with CGA. Pt demonstrated the ability to safely complete household mobility and complete  ADLs with little assist. Pt also notes good family support at home. Pt appears safe for a d/c home with family support and OPPT, however should pt remain in hospital OT will follow up to ensure safety and independence in ADLs.  -KF       Row Name 09/11/23 1545          Therapy Assessment/Plan (OT)    Criteria for Skilled Therapeutic Interventions Met (OT) yes;skilled treatment is necessary  -KF     Therapy Frequency (OT) daily  -KF       Row Name 09/11/23 1545          Therapy Plan Review/Discharge Plan (OT)    Anticipated Discharge Disposition (OT) home with assist  -KF       Row Name 09/11/23 1545          Positioning and Restraints    Pre-Treatment Position sitting in chair/recliner  -KF     Post Treatment Position chair  -KF     In Chair notified nsg;reclined;call light within reach;encouraged to call for assist;exit alarm on;with family/caregiver;legs elevated  -KF               User Key  (r) = Recorded By, (t) = Taken By, (c) = Cosigned By      Initials Name Provider Type    Clary Mas OT Occupational Therapist                   Outcome Measures       Row Name 09/11/23 1548          How much help from another is currently needed...    Putting on and taking off regular lower body clothing? 3  -KF     Bathing (including washing, rinsing, and drying) 3  -KF     Toileting (which includes using toilet bed pan or urinal) 3  -KF     Putting on and taking off regular upper body clothing 4  -KF     Taking care of personal grooming (such as brushing teeth) 4  -KF     Eating meals 4  -KF     AM-PAC 6 Clicks Score (OT) 21  -KF       Row Name 09/11/23 1548          Functional Assessment    Outcome Measure Options AM-PAC 6 Clicks Daily Activity (OT)  -KF               User Key  (r) = Recorded By, (t) = Taken By, (c) = Cosigned By      Initials Name Provider Type    Clary Mas OT Occupational Therapist                    Occupational Therapy Education       Title: PT OT SLP Therapies (In Progress)        Topic: Occupational Therapy (In Progress)       Point: ADL training (Done)       Description:   Instruct learner(s) on proper safety adaptation and remediation techniques during self care or transfers.   Instruct in proper use of assistive devices.                  Learning Progress Summary             Patient Acceptance, E, VU,DU by  at 9/11/2023 1500                         Point: Home exercise program (Not Started)       Description:   Instruct learner(s) on appropriate technique for monitoring, assisting and/or progressing therapeutic exercises/activities.                  Learner Progress:  Not documented in this visit.              Point: Precautions (Done)       Description:   Instruct learner(s) on prescribed precautions during self-care and functional transfers.                  Learning Progress Summary             Patient Acceptance, E, VU,DU by  at 9/11/2023 1500                         Point: Body mechanics (Done)       Description:   Instruct learner(s) on proper positioning and spine alignment during self-care, functional mobility activities and/or exercises.                  Learning Progress Summary             Patient Acceptance, E, VU,DU by  at 9/11/2023 1500                                         User Key       Initials Effective Dates Name Provider Type Discipline     08/09/23 -  Clary Wilburn OT Occupational Therapist OT                  OT Recommendation and Plan  Therapy Frequency (OT): daily  Plan of Care Review  Plan of Care Reviewed With: patient, spouse  Progress: no change  Outcome Evaluation: OT evaluation completed. Reviewed posterior hip precautions. Provided and reviewed ADL AE. Pt performed LBD with SBA and VCs, completing in room ambulation for toileting using a RWx with CGA. Pt demonstrated the ability to safely complete household mobility and complete ADLs with little assist. Pt also notes good family support at home. Pt appears safe for a d/c home with family support  and OPPT, however should pt remain in hospital OT will follow up to ensure safety and independence in ADLs.     Time Calculation:   Evaluation Complexity (OT)  Review Occupational Profile/Medical/Therapy History Complexity: brief/low complexity  Assessment, Occupational Performance/Identification of Deficit Complexity: 1-3 performance deficits  Clinical Decision Making Complexity (OT): problem focused assessment/low complexity  Overall Complexity of Evaluation (OT): low complexity     Time Calculation- OT       Row Name 09/11/23 1550             Time Calculation- OT    OT Start Time 1500  -KF         Timed Charges    63606 - OT Self Care/Mgmt Minutes 15  -KF         Untimed Charges    OT Eval/Re-eval Minutes 35  -KF         Total Minutes    Timed Charges Total Minutes 15  -KF      Untimed Charges Total Minutes 35  -KF       Total Minutes 50  -KF                User Key  (r) = Recorded By, (t) = Taken By, (c) = Cosigned By      Initials Name Provider Type    KF Clary Wilburn OT Occupational Therapist                  Therapy Charges for Today       Code Description Service Date Service Provider Modifiers Qty    [id number]  OT SELF CARE/MGMT/TRAIN EA 15 MIN 9/11/2023 Clary Wilburn OT GO 1    [id number]  OT EVAL LOW COMPLEXITY 3 9/11/2023 Clary Wilburn OT GO 1                 Clary Wilburn OT  9/11/2023

## 2023-09-11 NOTE — ANESTHESIA POSTPROCEDURE EVALUATION
Patient: Kelsi Costa    Procedure Summary       Date: 09/11/23 Room / Location:  [facility] /  [facility]    Anesthesia Start: 1017 Anesthesia Stop: 1236    Procedure: TOTAL HIP ARTHROPLASTY - RIGHT (Right: Hip) Diagnosis:       Primary osteoarthritis of right hip      (Primary osteoarthritis of right hip [M16.11])    Surgeons: Alin Lewis MD Provider: Ivan Mckeon MD    Anesthesia Type: general, spinal ASA Status: 3            Anesthesia Type: general, spinal    Vitals  Vitals Value Taken Time   /81 09/11/23 1236   Temp 97 °F (36.1 °C) 09/11/23 1236   Pulse 77 09/11/23 1236   Resp 18 09/11/23 1236   SpO2 99 % 09/11/23 1236           Post Anesthesia Care and Evaluation    Patient location during evaluation: PACU  Patient participation: complete - patient participated  Level of consciousness: sleepy but conscious  Pain management: adequate (Spinal attempted, unsuccessful due to depth and patient movement.)  Reason for not using neuraxial anesthesia or a peripheral nerve block: failed block:  Airway patency: patent  Anesthetic complications: No anesthetic complications  PONV Status: none  Cardiovascular status: hemodynamically stable and acceptable  Respiratory status: nonlabored ventilation, acceptable and nasal cannula  Hydration status: acceptable

## 2023-09-12 ENCOUNTER — TELEPHONE (OUTPATIENT)
Dept: ORTHOPEDIC SURGERY | Facility: CLINIC | Age: 53
End: 2023-09-12
Payer: MEDICARE

## 2023-09-12 NOTE — CASE MANAGEMENT/SOCIAL WORK
Continued Stay Note   Jonelle     Patient Name: Kelsi Costa  MRN: [id number]  Today's Date: 9/12/2023    Admit Date: 9/11/2023    Plan: Home with family assistance and [facility]   Discharge Plan       Row Name 09/12/23 1135       Plan    Plan Home with family assistance and [facility]    Patient/Family in Agreement with Plan yes    Plan Comments Received call from Becki HERRERA stating that they are out network with Ms Costa's [employer].    LACI contacted Vira at [facility] and she accepted the patient for Outpatient PT, home and then transition to their facility.    Called Ms. Costa, by phone, updated her, and she is agreeable to [facility].  She stated that [facility] has already contacted her to schedule a home visit.   Requested that Ms. Costa contact CM, if any additional questions.    Final Discharge Disposition Code 01 - home or self-care                                  Expected Discharge Date and Time       Expected Discharge Date Expected Discharge Time    Sep 11, 2023               Marah Mustafa RN

## 2023-09-12 NOTE — TELEPHONE ENCOUNTER
I called and let her know that if he is out more than 3 consecutive days he qualifies for FMLA.  She said that he hasn't been there for 1 year.    I told her we can give him an excuse for work for the next 3 days.  She will check [facility] for the letter.    Could you please type that up?  Thanks.  Karin

## 2023-09-12 NOTE — TELEPHONE ENCOUNTER
Pt called. She needs a work excuse for her  who is staying home with her following her hip replacement. She had her hip replaced yesterday and has to have someone stay with her for four days.     the hospital only gave her a one day excuse.    Her 's name is connie delaney.     Send via "[employer].    Call pt can leave message if there is any issue. Otherwise she will check "[employer] for note.    [phone]

## 2023-09-25 LAB
QT INTERVAL: 416 MS
QTC INTERVAL: 436 MS

## 2023-10-02 ENCOUNTER — OFFICE VISIT (OUTPATIENT)
Dept: ORTHOPEDIC SURGERY | Facility: CLINIC | Age: 53
End: 2023-10-02
Payer: MEDICARE

## 2023-10-02 VITALS — TEMPERATURE: 96.9 F

## 2023-10-02 DIAGNOSIS — Z96.641 S/P TOTAL RIGHT HIP ARTHROPLASTY: Primary | ICD-10-CM

## 2023-10-02 PROCEDURE — 99024 POSTOP FOLLOW-UP VISIT: CPT

## 2023-10-02 RX ORDER — IBUPROFEN 800 MG/1
TABLET ORAL
COMMUNITY
Start: 2023-09-05

## 2023-10-02 NOTE — PROGRESS NOTES
[facility] Follow Up       Office Follow Up Visit       Patient Name: Kelsi Costa    Chief Complaint:   Chief Complaint   Patient presents with    Post-op     3 week check up -- 3 week status post  - TOTAL HIP ARTHROPLASTY 9-11-23        Referring Physician: No ref. provider found    History of Present Illness:   Kelsi Costa returns to clinic today for 3-week postop visit s/p right total hip arthroplasty 9/11/2023 with Dr. Lewis.  She reports to be doing well.  Pain has been controlled.  She is taking methocarbamol and oxycodone as needed.  She is participating in physical therapy at [facility].  Progressing as expected.  She is ambulating well with a walker.  Overall she reports improvement in pain compared to prior to surgery.      Subjective     Review of Systems   Constitutional: Negative.  Negative for chills, fatigue and fever.   HENT: Negative.  Negative for congestion and dental problem.    Eyes: Negative.  Negative for blurred vision.   Respiratory: Negative.  Negative for shortness of breath.    Cardiovascular: Negative.  Negative for leg swelling.   Gastrointestinal: Negative.  Negative for abdominal pain.   Endocrine: Negative.  Negative for polyuria.   Genitourinary: Negative.  Negative for difficulty urinating.   Musculoskeletal:  Positive for arthralgias.   Skin: Negative.    Allergic/Immunologic: Negative.    Neurological: Negative.    Hematological: Negative.  Negative for adenopathy.   Psychiatric/Behavioral: Negative.  Negative for behavioral problems.       I have reviewed and updated the following portions of the patient's history and review of systems: allergies, current medications, past family history, past medical history, past social history, past surgical history and problem list.    Medications:   Current Outpatient Medications:     amitriptyline (ELAVIL) 10  MG tablet, Take 1 tablet by mouth Every Night., Disp: , Rfl:     aspirin (ASPIR) 81 MG EC tablet, Take 1 tablet by mouth 2 (Two) Times a Day., Disp: 60 tablet, Rfl: 0    atorvastatin (LIPITOR) 80 MG tablet, Take 1 tablet by mouth Every Night., Disp: , Rfl:     azelastine (ASTELIN) 0.1 % nasal spray, 1 spray into the nostril(s) as directed by provider 4 (Four) Times a Day As Needed., Disp: , Rfl:     budesonide (RINOCORT AQUA) 32 MCG/ACT nasal spray, 1 spray into the nostril(s) as directed by provider 2 (Two) Times a Day., Disp: , Rfl:     cefdinir (OMNICEF) 300 MG capsule, 1 capsule., Disp: , Rfl:     chlorhexidine (HIBICLENS) 4 % external liquid, Apply  topically to the appropriate area as directed Daily As Needed for Wound Care. Shower daily with hibiclens solution as directed 5 days prior to surgery., Disp: 236 mL, Rfl: 0    cholecalciferol (VITAMIN D3) 25 MCG (1000 UT) tablet, Take 1 tablet by mouth Daily., Disp: , Rfl:     diclofenac (VOLTAREN) 50 MG EC tablet, Take 1 tablet by mouth 2 (Two) Times a Day., Disp: , Rfl:     diltiaZEM (CARDIZEM) 60 MG tablet, Take 1 tablet by mouth Every Evening., Disp: , Rfl:     estradiol (ESTRACE) 1 MG tablet, Take 1 tablet by mouth Daily., Disp: , Rfl:     fluconazole (DIFLUCAN) 150 MG tablet, 1 tablet., Disp: , Rfl:     fluticasone (FLONASE) 50 MCG/ACT nasal spray, 2 sprays into the nostril(s) as directed by provider Daily., Disp: , Rfl:     gabapentin (NEURONTIN) 800 MG tablet, Take 1 tablet by mouth 2 (two) times a day., Disp: , Rfl:      MG tablet, , Disp: , Rfl:     levocetirizine (XYZAL) 5 MG tablet, Take 1 tablet by mouth Every Evening., Disp: , Rfl:     losartan-hydrochlorothiazide (HYZAAR) 100-25 MG per tablet, Take 1 tablet by mouth Every Evening., Disp: , Rfl:     metFORMIN (GLUCOPHAGE) 500 MG tablet, Take 1 tablet by mouth 2 (Two) Times a Day With Meals. Patient takes only once daily instead of twice daily, Disp: , Rfl:     methocarbamol (ROBAXIN) 500 MG  tablet, Take 1 tablet by mouth Daily As Needed for Muscle Spasms., Disp: , Rfl:     Movantik 25 MG tablet, TAKE 1/2 TABLET BY MOUTH EVERY DAY FOR CONSTIPATION, Disp: , Rfl:     naloxone (NARCAN) 4 MG/0.1ML nasal spray, Call 911. Don't prime. [place] in 1 nostril for overdose. Repeat in 2-3 minutes in other nostril if no or minimal breathing/responsiveness., Disp: 2 each, Rfl: 0    nitroglycerin (NITROSTAT) 0.4 MG SL tablet, Place 1 tablet under the tongue Every 5 (Five) Minutes As Needed for Chest Pain. Take no more than 3 doses in 15 minutes., Disp: 90 tablet, Rfl: 0    ondansetron ODT (ZOFRAN-ODT) 4 MG disintegrating tablet, Place 1 tablet on the tongue As Needed for Nausea or Vomiting., Disp: , Rfl:     oxyCODONE (ROXICODONE) 10 MG tablet, Take 1 tablet by mouth Every 8 (Eight) Hours As Needed for Moderate Pain., Disp: , Rfl:     oxyCODONE (Roxicodone) 5 MG immediate release tablet, Take 1 tablet by mouth Every 4 (Four) Hours As Needed for Moderate Pain. May alternate with 10 mg prescribed by pain management, or take with 10 mg dose for severe pain., Disp: 25 tablet, Rfl: 0    Ozempic, 1 MG/DOSE, 4 MG/3ML solution pen-injector, Inject 1 mg under the skin into the appropriate area as directed 1 (One) Time Per Week. Mondays, Disp: , Rfl:     potassium chloride 10 MEQ CR tablet, Take 1 tablet by mouth Daily., Disp: , Rfl:     ranolazine (RANEXA) 500 MG 12 hr tablet, Take 1 tablet by mouth 2 (Two) Times a Day. Patient states she takes both tablets together QPM instead of 1 tablet BID, Disp: , Rfl:     topiramate (TOPAMAX) 50 MG tablet, , Disp: , Rfl:     Ventolin  (90 Base) MCG/ACT inhaler, Inhale 2 puffs See Admin Instructions. Inhale 2 puffs by mouth every 4 to 6 hours as needed, Disp: , Rfl:     vitamin B-12 (CYANOCOBALAMIN) 1000 MCG tablet, Take 1 tablet by mouth Daily., Disp: , Rfl:     Allergies:   Allergies   Allergen Reactions    Levaquin [Levofloxacin] Itching and Rash         Objective      Vital  Signs:   Vitals:    10/02/23 1422   Temp: 96.9 °F (36.1 °C)       Ortho Exam:  General: no acute distress, comfortable  Vitals reviewed in chart    Musculoskeletal Exam:    SIDE: Right hip  Incision healing well without sign of infection or drainage  Range of motion measurements (degrees): 0-90  Painful arc of motion: No  No evidence of septic joint      Results Review:  XR Hip With or Without Pelvis 2 - 3 View Right  Right Hip Radiographs  Indication: status-post right total hip arthroplasty, Dr. Lewis  Views: low AP pelvis and lateral of the right hip    Comparison: no change compared to prior study, 9/11/2023    Findings:   The components are well aligned, with no signs of loosening or failure.          Assessment / Plan      Assessment:   Diagnoses and all orders for this visit:    1. S/P total right hip arthroplasty (Primary)  -     XR Hip With or Without Pelvis 2 - 3 View Right        Quality Metrics:   BMI:   Class 2 Severe Obesity (BMI >=35 and <=39.9). Obesity-related health conditions include the following: none.     Tobacco:   Kelsi Costa  reports that she quit smoking about 4 months ago. Her smoking use included cigars. She started smoking about 6 years ago. She has been exposed to tobacco smoke. She has never used smokeless tobacco..  She understands risk associated with tobacco use.    Plan:  X-ray films reviewed today show well aligned components without sign of fracture or loosening.  Progressing well with physical therapy.  Encouraged to continue for gait training and strengthening.  May transition to cane use as comfortable.  Continue with posterior hip precautions for an additional 3 weeks.  Recommend transitioning to anti-inflammatories for pain and swelling.      Follow Up:   3 weeks with Dr. Lewis with repeat AP pelvis upon arrival    Ruth Ann Padilla PA-C  [facility]    Dictated using Dragon Speech Recognition.

## 2023-10-24 ENCOUNTER — OFFICE VISIT (OUTPATIENT)
Dept: ORTHOPEDIC SURGERY | Facility: CLINIC | Age: 53
End: 2023-10-24
Payer: MEDICARE

## 2023-10-24 VITALS — TEMPERATURE: 96.9 F

## 2023-10-24 DIAGNOSIS — Z96.641 S/P TOTAL RIGHT HIP ARTHROPLASTY: Primary | ICD-10-CM

## 2023-10-24 PROCEDURE — 1160F RVW MEDS BY RX/DR IN RCRD: CPT | Performed by: ORTHOPAEDIC SURGERY

## 2023-10-24 PROCEDURE — 99024 POSTOP FOLLOW-UP VISIT: CPT | Performed by: ORTHOPAEDIC SURGERY

## 2023-10-24 PROCEDURE — 1159F MED LIST DOCD IN RCRD: CPT | Performed by: ORTHOPAEDIC SURGERY

## 2023-10-24 NOTE — PROGRESS NOTES
Orthopaedic Clinic Note:  Hip Post Op    Chief Complaint   Patient presents with    Post-op     3 week follow up -- 6 week status post  - TOTAL HIP ARTHROPLASTY 23         HPI    Ms. Costa is 6  week(s) s/p right total hip arthroplasty. Rates pain 4/10. She is ambulating with a cane and is taking Oxycodone for pain control. She denies fevers, chills, or constitutional symptoms. She is continuing outpatient PT. Patient is improving overall.  She denies complications.  She is happy with her progress.    Past Medical History:   Diagnosis Date    Angina at rest     Arthritis of neck     Asthma     Back pain     Bursitis of hip     Cervical disc disorder     Diabetes mellitus     Elevated cholesterol     Fibromyalgia     Headache     Hip arthrosis     Hyperlipidemia     Hypertension     Knee swelling     Low back strain     Lumbosacral disc disease     Mitral valve prolapse     Sleep apnea     CPAP compliant, auto pressure adjustment per patient    Tear of meniscus of knee     Tendinitis of knee     Vertigo     Wears glasses       Past Surgical History:   Procedure Laterality Date    CARDIAC CATHETERIZATION N/A 2017    Procedure: Left Heart Cath;  Surgeon: Darnell Allen MD;  Location:  [facility] CATH INVASIVE LOCATION;  Service:      SECTION      COLONOSCOPY      ESSURE TUBAL LIGATION      FEMORAL ARTERY REPAIR Right     HYSTERECTOMY      JOINT REPLACEMENT Bilateral     L knee  R knee ? 2008    KNEE ARTHROPLASTY, PARTIAL REPLACEMENT Left     TOTAL HIP ARTHROPLASTY Right 2023    Procedure: TOTAL HIP ARTHROPLASTY - RIGHT;  Surgeon: Alin Lewis MD;  Location:  [facility] OR;  Service: Orthopedics;  Laterality: Right;    TOTAL KNEE ARTHROPLASTY Right     TRIGGER POINT INJECTION      May 2022 in R hip      Family History   Problem Relation Age of Onset    Asthma Mother     Arthritis Mother     Anesthesia problems Mother     Cancer Mother      Hypertension Father     Arthritis Father     Diabetes Father     Heart disease Brother     Scoliosis Brother     Diabetes Maternal Grandmother     Osteoporosis Maternal Grandmother      Social History     Socioeconomic History    Marital status:    Tobacco Use    Smoking status: Former     Types: Cigars     Start date: 2017     Quit date: 2023     Years since quittin.3     Passive exposure: Current    Smokeless tobacco: Never   Vaping Use    Vaping Use: Never used   Substance and Sexual Activity    Alcohol use: Not Currently     Comment: Occasionally    Drug use: No    Sexual activity: Not Currently     Partners: Male     Birth control/protection: Hysterectomy      Current Outpatient Medications on File Prior to Visit   Medication Sig Dispense Refill    amitriptyline (ELAVIL) 10 MG tablet Take 1 tablet by mouth Every Night.      aspirin (ASPIR) 81 MG EC tablet Take 1 tablet by mouth 2 (Two) Times a Day. 60 tablet 0    atorvastatin (LIPITOR) 80 MG tablet Take 1 tablet by mouth Every Night.      azelastine (ASTELIN) 0.1 % nasal spray 1 spray into the nostril(s) as directed by provider 4 (Four) Times a Day As Needed.      budesonide (RINOCORT AQUA) 32 MCG/ACT nasal spray 1 spray into the nostril(s) as directed by provider 2 (Two) Times a Day.      cefdinir (OMNICEF) 300 MG capsule 1 capsule.      chlorhexidine (HIBICLENS) 4 % external liquid Apply  topically to the appropriate area as directed Daily As Needed for Wound Care. Shower daily with hibiclens solution as directed 5 days prior to surgery. 236 mL 0    cholecalciferol (VITAMIN D3) 25 MCG (1000 UT) tablet Take 1 tablet by mouth Daily.      diclofenac (VOLTAREN) 50 MG EC tablet Take 1 tablet by mouth 2 (Two) Times a Day.      diltiaZEM (CARDIZEM) 60 MG tablet Take 1 tablet by mouth Every Evening.      estradiol (ESTRACE) 1 MG tablet Take 1 tablet by mouth Daily.      fluconazole (DIFLUCAN) 150 MG tablet 1 tablet.      fluticasone (FLONASE) 50  MCG/ACT nasal spray 2 sprays into the nostril(s) as directed by provider Daily.      gabapentin (NEURONTIN) 800 MG tablet Take 1 tablet by mouth 2 (two) times a day.       MG tablet       levocetirizine (XYZAL) 5 MG tablet Take 1 tablet by mouth Every Evening.      losartan-hydrochlorothiazide (HYZAAR) 100-25 MG per tablet Take 1 tablet by mouth Every Evening.      metFORMIN (GLUCOPHAGE) 500 MG tablet Take 1 tablet by mouth 2 (Two) Times a Day With Meals. Patient takes only once daily instead of twice daily      methocarbamol (ROBAXIN) 500 MG tablet Take 1 tablet by mouth Daily As Needed for Muscle Spasms.      Movantik 25 MG tablet TAKE 1/2 TABLET BY MOUTH EVERY DAY FOR CONSTIPATION      naloxone (NARCAN) 4 MG/0.1ML nasal spray Call 911. Don't prime. [place] in 1 nostril for overdose. Repeat in 2-3 minutes in other nostril if no or minimal breathing/responsiveness. 2 each 0    nitroglycerin (NITROSTAT) 0.4 MG SL tablet Place 1 tablet under the tongue Every 5 (Five) Minutes As Needed for Chest Pain. Take no more than 3 doses in 15 minutes. 90 tablet 0    ondansetron ODT (ZOFRAN-ODT) 4 MG disintegrating tablet Place 1 tablet on the tongue As Needed for Nausea or Vomiting.      oxyCODONE (ROXICODONE) 10 MG tablet Take 1 tablet by mouth Every 8 (Eight) Hours As Needed for Moderate Pain.      oxyCODONE (Roxicodone) 5 MG immediate release tablet Take 1 tablet by mouth Every 4 (Four) Hours As Needed for Moderate Pain. May alternate with 10 mg prescribed by pain management, or take with 10 mg dose for severe pain. 25 tablet 0    Ozempic, 1 MG/DOSE, 4 MG/3ML solution pen-injector Inject 1 mg under the skin into the appropriate area as directed 1 (One) Time Per Week. Mondays      potassium chloride 10 MEQ CR tablet Take 1 tablet by mouth Daily.      ranolazine (RANEXA) 500 MG 12 hr tablet Take 1 tablet by mouth 2 (Two) Times a Day. Patient states she takes both tablets together QPM instead of 1 tablet BID       topiramate (TOPAMAX) 50 MG tablet       Ventolin  (90 Base) MCG/ACT inhaler Inhale 2 puffs See Admin Instructions. Inhale 2 puffs by mouth every 4 to 6 hours as needed      vitamin B-12 (CYANOCOBALAMIN) 1000 MCG tablet Take 1 tablet by mouth Daily.       No current facility-administered medications on file prior to visit.      Allergies   Allergen Reactions    Levaquin [Levofloxacin] Itching and Rash        Review of Systems     Physical Exam  Temperature 96.9 °F (36.1 °C).    There is no height or weight on file to calculate BMI.    GENERAL APPEARANCE: awake, alert, oriented, in no acute distress and well developed, well nourished  LUNGS:  breathing nonlabored  EXTREMITIES: no clubbing, cyanosis  PERIPHERAL PULSES: palpable dorsalis pedis and posterior tibial pulses bilaterally.    GAIT:  Normal            Hip Exam:  Right    RANGE OF MOTION:  EXTENSION/FLEXION:  normal (0-110 degrees)  IR:  20  ER:  40  PAIN WITH HIP MOTION:  no  PAIN WITH LOGROLL:  no     STRENGTH:  ABDUCTOR:  5/5  ADDUCTOR:  5/5  HIP FLEXION:  5/5    GREATER TROCHANTER BURSAL PAIN:  no    SENSATION TO LIGHT TOUCH:  DEEP PERONEAL/SUPERFICIAL PERONEAL/SURAL/SAPHENOUS/TIBIAL:   intact    EDEMA:  no  ERYTHEMA:  no  WOUNDS/INCISIONS:   yes, well healed surgical incision without evidence of erythema or drainage  _______________________________________________________________  _______________________________________________________________    RADIOGRAPHIC FINDINGS:   Indication: Status post right total hip arthroplasty    Comparison: Todays xrays were compared to previous xrays from 10/2/23    AP pelvis: Right: Demonstrate a well positioned total hip without evidence of wear, loosening, fracture or osteolysis, femoral head is concentrically reduced within the acetabulum and No significant changes compared to prior radiographs.;Left: mild joint space narrowing, minimal osteophyte formation and No significant changes compared to prior  radiographs.        Assessment/Plan:   Diagnosis Plan   1. S/P total right hip arthroplasty  XR Pelvis 1 or 2 View        Patient is doing well 6 weeks status post right total hip arthroplasty.  Encouraged patient to continue working on gait training and strengthening.  Wean from the cane as tolerated.  She may relax her hip precautions.  I will see the patient back in 2 months for repeat assessment x-ray AP pelvis on return.    Alin Lewis MD  10/24/23  10:26 EDT

## 2024-09-03 ENCOUNTER — OFFICE VISIT (OUTPATIENT)
Dept: ORTHOPEDIC SURGERY | Facility: CLINIC | Age: 54
End: 2024-09-03
Payer: MEDICARE

## 2024-09-03 VITALS
SYSTOLIC BLOOD PRESSURE: 142 MMHG | DIASTOLIC BLOOD PRESSURE: 82 MMHG | BODY MASS INDEX: 35.99 KG/M2 | HEIGHT: 64 IN | WEIGHT: 210.8 LBS

## 2024-09-03 DIAGNOSIS — M54.16 RADICULOPATHY, LUMBAR REGION: ICD-10-CM

## 2024-09-03 DIAGNOSIS — M70.61 TROCHANTERIC BURSITIS OF RIGHT HIP: ICD-10-CM

## 2024-09-03 DIAGNOSIS — Z96.641 S/P TOTAL RIGHT HIP ARTHROPLASTY: Primary | ICD-10-CM

## 2024-09-03 PROCEDURE — 99213 OFFICE O/P EST LOW 20 MIN: CPT | Performed by: ORTHOPAEDIC SURGERY

## 2024-09-03 PROCEDURE — 1160F RVW MEDS BY RX/DR IN RCRD: CPT | Performed by: ORTHOPAEDIC SURGERY

## 2024-09-03 PROCEDURE — 1159F MED LIST DOCD IN RCRD: CPT | Performed by: ORTHOPAEDIC SURGERY

## 2024-09-03 PROCEDURE — 20610 DRAIN/INJ JOINT/BURSA W/O US: CPT | Performed by: ORTHOPAEDIC SURGERY

## 2024-09-03 RX ORDER — LIDOCAINE HYDROCHLORIDE 10 MG/ML
3 INJECTION, SOLUTION EPIDURAL; INFILTRATION; INTRACAUDAL; PERINEURAL
Status: COMPLETED | OUTPATIENT
Start: 2024-09-03 | End: 2024-09-03

## 2024-09-03 RX ORDER — TRIAMCINOLONE ACETONIDE 40 MG/ML
80 INJECTION, SUSPENSION INTRA-ARTICULAR; INTRAMUSCULAR
Status: COMPLETED | OUTPATIENT
Start: 2024-09-03 | End: 2024-09-03

## 2024-09-03 RX ORDER — AZELASTINE HYDROCHLORIDE 0.5 MG/ML
1 SOLUTION/ DROPS OPHTHALMIC
COMMUNITY

## 2024-09-03 RX ORDER — BUPIVACAINE HYDROCHLORIDE 2.5 MG/ML
3 INJECTION, SOLUTION EPIDURAL; INFILTRATION; INTRACAUDAL
Status: COMPLETED | OUTPATIENT
Start: 2024-09-03 | End: 2024-09-03

## 2024-09-03 RX ORDER — OXYCODONE AND ACETAMINOPHEN 10; 325 MG/1; MG/1
1 TABLET ORAL
COMMUNITY

## 2024-09-03 RX ADMIN — LIDOCAINE HYDROCHLORIDE 3 ML: 10 INJECTION, SOLUTION EPIDURAL; INFILTRATION; INTRACAUDAL; PERINEURAL at 10:49

## 2024-09-03 RX ADMIN — TRIAMCINOLONE ACETONIDE 80 MG: 40 INJECTION, SUSPENSION INTRA-ARTICULAR; INTRAMUSCULAR at 10:49

## 2024-09-03 RX ADMIN — BUPIVACAINE HYDROCHLORIDE 3 ML: 2.5 INJECTION, SOLUTION EPIDURAL; INFILTRATION; INTRACAUDAL at 10:49

## 2024-09-03 NOTE — PROGRESS NOTES
Procedure   - Large Joint Arthrocentesis: R greater trochanteric bursa on 9/3/2024 10:49 AM  Indications: pain  Details: 21 G needle, lateral approach  Medications: 3 mL lidocaine PF 1% 1 %; 3 mL bupivacaine (PF) 0.25 %; 80 mg triamcinolone acetonide 40 MG/ML  Outcome: tolerated well, no immediate complications  Procedure, treatment alternatives, risks and benefits explained, specific risks discussed. Consent was given by the patient. Immediately prior to procedure a time out was called to verify the correct patient, procedure, equipment, support staff and site/side marked as required. Patient was prepped and draped in the usual sterile fashion.

## 2024-09-03 NOTE — PROGRESS NOTES
Orthopaedic Clinic Note: Hip Established Patient    Chief Complaint   Patient presents with    Follow-up     10 month follow up - 1 year S/P Total Hip Arthroplasty - Right (dos: 23)        HPI    It has been 10  month(s) since Ms. Costa's last visit. She returns to clinic today for follow-up right total hip arthroplasty.  Patient is approximately a year out from surgery.  She rates her pain a 6/10 on the pain scale due to pain on the lateral hip.  She does have occasional groin pain that is unrelated to activity or movement of the hip.  She describes a zinging/burning sensation that occurs randomly.  It will occur at different times whether she is moving, sitting, lying down.  She states that radiates down the leg as well.  She is ambulating with no assistive device.  Denies fevers chills or constitutional symptoms.    Past Medical History:   Diagnosis Date    Angina at rest     Arthritis of neck     Asthma     Back pain     Bursitis of hip     Cervical disc disorder     Diabetes mellitus     Elevated cholesterol     Fibromyalgia     Headache     Hip arthrosis     Hyperlipidemia     Hypertension     Knee swelling     Low back strain     Lumbosacral disc disease     Mitral valve prolapse     Sleep apnea     CPAP compliant, auto pressure adjustment per patient    Tear of meniscus of knee     Tendinitis of knee     Vertigo     Wears glasses       Past Surgical History:   Procedure Laterality Date    CARDIAC CATHETERIZATION N/A 2017    Procedure: Left Heart Cath;  Surgeon: Darnell Allen MD;  Location: [facility] CATH INVASIVE LOCATION;  Service:      SECTION      COLONOSCOPY      ESSURE TUBAL LIGATION      FEMORAL ARTERY REPAIR Right     HYSTERECTOMY      JOINT REPLACEMENT Bilateral     L knee 2019 R knee ? 2008    KNEE ARTHROPLASTY, PARTIAL REPLACEMENT Left     TOTAL HIP ARTHROPLASTY Right 2023    Procedure: TOTAL HIP ARTHROPLASTY - RIGHT;   Surgeon: Alin Lewis MD;  Location: [facility];  Service: Orthopedics;  Laterality: Right;    TOTAL KNEE ARTHROPLASTY Right     TRIGGER POINT INJECTION      May 2022 in R hip      Family History   Problem Relation Age of Onset    Asthma Mother     Arthritis Mother     Anesthesia problems Mother     Cancer Mother     Hypertension Father     Arthritis Father     Diabetes Father     Heart disease Brother     Scoliosis Brother     Diabetes Maternal Grandmother     Osteoporosis Maternal Grandmother      Social History     Socioeconomic History    Marital status:    Tobacco Use    Smoking status: Former     Types: Cigars     Start date: 2017     Quit date: 2023     Years since quittin.2     Passive exposure: Current    Smokeless tobacco: Never   Vaping Use    Vaping status: Never Used   Substance and Sexual Activity    Alcohol use: Not Currently     Comment: Occasionally    Drug use: No    Sexual activity: Not Currently     Partners: Male     Birth control/protection: Hysterectomy      Current Outpatient Medications on File Prior to Visit   Medication Sig Dispense Refill    amitriptyline (ELAVIL) 10 MG tablet Take 1 tablet by mouth Every Night.      atorvastatin (LIPITOR) 80 MG tablet Take 1 tablet by mouth Every Night.      azelastine (ASTELIN) 0.1 % nasal spray 1 spray into the nostril(s) as directed by provider 4 (Four) Times a Day As Needed.      azelastine (OPTIVAR) 0.05 % ophthalmic solution 1 drop.      cholecalciferol (VITAMIN D3) 25 MCG (1000 UT) tablet Take 1 tablet by mouth Daily.      diltiaZEM (CARDIZEM) 60 MG tablet Take 1 tablet by mouth Every Evening.      estradiol (ESTRACE) 1 MG tablet Take 1 tablet by mouth Daily.      fluconazole (DIFLUCAN) 150 MG tablet 1 tablet.      fluticasone (FLONASE) 50 MCG/ACT nasal spray 2 sprays into the nostril(s) as directed by provider Daily.      gabapentin (NEURONTIN) 800 MG tablet Take 1 tablet by mouth 2 (two) times a day.       MG  tablet       levocetirizine (XYZAL) 5 MG tablet Take 1 tablet by mouth Every Evening.      losartan-hydrochlorothiazide (HYZAAR) 100-25 MG per tablet Take 1 tablet by mouth Every Evening.      metFORMIN (GLUCOPHAGE) 500 MG tablet Take 1 tablet by mouth 2 (Two) Times a Day With Meals. Patient takes only once daily instead of twice daily      Movantik 25 MG tablet TAKE 1/2 TABLET BY MOUTH EVERY DAY FOR CONSTIPATION      naloxone (NARCAN) 4 MG/0.1ML nasal spray Call 911. Don't prime. [place] in 1 nostril for overdose. Repeat in 2-3 minutes in other nostril if no or minimal breathing/responsiveness. 2 each 0    nitroglycerin (NITROSTAT) 0.4 MG SL tablet Place 1 tablet under the tongue Every 5 (Five) Minutes As Needed for Chest Pain. Take no more than 3 doses in 15 minutes. 90 tablet 0    ondansetron ODT (ZOFRAN-ODT) 4 MG disintegrating tablet Place 1 tablet on the tongue As Needed for Nausea or Vomiting.      oxyCODONE-acetaminophen (PERCOCET)  MG per tablet Take 1 tablet by mouth.      Ozempic, 1 MG/DOSE, 4 MG/3ML solution pen-injector Inject 1 mg under the skin into the appropriate area as directed 1 (One) Time Per Week. Mondays      potassium chloride 10 MEQ CR tablet Take 1 tablet by mouth Daily.      ranolazine (RANEXA) 500 MG 12 hr tablet Take 1 tablet by mouth 2 (Two) Times a Day. Patient states she takes both tablets together QPM instead of 1 tablet BID      topiramate (TOPAMAX) 50 MG tablet       Ventolin  (90 Base) MCG/ACT inhaler Inhale 2 puffs See Admin Instructions. Inhale 2 puffs by mouth every 4 to 6 hours as needed      vitamin B-12 (CYANOCOBALAMIN) 1000 MCG tablet Take 1 tablet by mouth Daily.      [DISCONTINUED] aspirin (ASPIR) 81 MG EC tablet Take 1 tablet by mouth 2 (Two) Times a Day. 60 tablet 0    [DISCONTINUED] budesonide (RINOCORT AQUA) 32 MCG/ACT nasal spray 1 spray into the nostril(s) as directed by provider 2 (Two) Times a Day.      [DISCONTINUED] cefdinir (OMNICEF) 300 MG capsule 1  "capsule.      [DISCONTINUED] chlorhexidine (HIBICLENS) 4 % external liquid Apply  topically to the appropriate area as directed Daily As Needed for Wound Care. Shower daily with hibiclens solution as directed 5 days prior to surgery. 236 mL 0    [DISCONTINUED] diclofenac (VOLTAREN) 50 MG EC tablet Take 1 tablet by mouth 2 (Two) Times a Day.      [DISCONTINUED] methocarbamol (ROBAXIN) 500 MG tablet Take 1 tablet by mouth Daily As Needed for Muscle Spasms.      [DISCONTINUED] oxyCODONE (ROXICODONE) 10 MG tablet Take 1 tablet by mouth Every 8 (Eight) Hours As Needed for Moderate Pain.      [DISCONTINUED] oxyCODONE (Roxicodone) 5 MG immediate release tablet Take 1 tablet by mouth Every 4 (Four) Hours As Needed for Moderate Pain. May alternate with 10 mg prescribed by pain management, or take with 10 mg dose for severe pain. 25 tablet 0     No current facility-administered medications on file prior to visit.      Allergies   Allergen Reactions    Levaquin [Levofloxacin] Itching and Rash        Review of Systems   Constitutional: Negative.    HENT: Negative.     Eyes: Negative.    Respiratory: Negative.     Cardiovascular: Negative.    Gastrointestinal: Negative.    Endocrine: Negative.    Genitourinary: Negative.    Musculoskeletal:  Positive for arthralgias.   Skin: Negative.    Allergic/Immunologic: Negative.    Neurological: Negative.    Hematological: Negative.    Psychiatric/Behavioral: Negative.          The patient's Review of Systems was personally reviewed and confirmed as accurate.    Physical Exam  Blood pressure 142/82, height 162.6 cm (64.02\"), weight 95.6 kg (210 lb 12.8 oz).    Body mass index is 36.17 kg/m².    GENERAL APPEARANCE: awake, alert, oriented, in no acute distress and well developed, well nourished  LUNGS:  breathing nonlabored  EXTREMITIES: no clubbing, cyanosis  PERIPHERAL PULSES: palpable dorsalis pedis and posterior tibial pulses bilaterally.    GAIT:  Normal            Hip Exam:  Right   "   RANGE OF MOTION:  EXTENSION/FLEXION:  normal (0-110 degrees)  IR:  20  ER:  40  PAIN WITH HIP MOTION:  no  PAIN WITH LOGROLL:  no      STRENGTH:  ABDUCTOR:    5/5  ADDUCTOR:  5/5  HIP FLEXION:  5/5     GREATER TROCHANTER BURSAL PAIN:  yes    SENSATION TO LIGHT TOUCH:  DEEP PERONEAL/SUPERFICIAL PERONEAL/SURAL/SAPHENOUS/TIBIAL:   intact    EDEMA:  no  ERYTHEMA:  no  WOUNDS/INCISIONS:   yes, well healed surgical incision without evidence of erythema or drainage  _________________________________________________________________  _________________________________________________________________    RADIOGRAPHIC FINDINGS:   Indication: Status post right total hip arthroplasty    Comparison: Todays xrays were compared to previous xrays from 10/24/2023    AP pelvis: Right: Demonstrate a well positioned total hip without evidence of wear, loosening, fracture or osteolysis, femoral head is concentrically reduced within the acetabulum and No significant changes compared to prior radiographs.;Left: mild joint space narrowing, minimal osteophyte formation and No significant changes compared to prior radiographs.      Assessment/Plan:   Diagnosis Plan   1. S/P total right hip arthroplasty  XR Pelvis 1 or 2 View      2. Trochanteric bursitis of right hip  Ambulatory Referral to Physical Therapy for Evaluation & Treatment      3. Radiculopathy, lumbar region          I reassured the patient that I see no clinical radiographic evidence of complication from her total hip arthroplasty.  She does have trochanteric bursitis that is the main source of her pain.  She also has random periodic zinging pain down the groin and medial aspect of her leg primarily in the L2 distribution.  I suspect this is coming from her back as I cannot elicit the pain on exam today.  She is agreeable to trochanteric bursa cortisone injection as well as referral to physical therapy for strengthening of the hip muscles.  She will follow-up with me as  needed.    Procedure Note:  I discussed with the patient the potential benefits of performing a therapeutic injection of the right hip trochanteric bursa as well as potential risks including but not limited to infection, swelling, pain, bleeding, bruising, nerve/vessel damage, skin color changes, transient elevation in blood glucose levels, and fat atrophy. After informed consent and verifying correct patient, procedure site, and type of procedure, the area was prepped with alcohol, ethyl chloride was used to numb the skin. Via the direct lateral approach, 3 cc of 1% lidocaine, 3 cc of 0.25% Marcaine and 2 cc of 40mg/ml of Kenalog were injected into the right hip trochanteric bursa. The patient tolerated the procedure well. There were no complications. A sterile dressing was placed over the injection site.      Alin Lewis MD  09/03/24  10:51 EDT

## 2024-09-25 ENCOUNTER — TREATMENT (OUTPATIENT)
Dept: PHYSICAL THERAPY | Facility: CLINIC | Age: 54
End: 2024-09-25
Payer: MEDICARE

## 2024-09-25 DIAGNOSIS — M25.551 CHRONIC RIGHT HIP PAIN: Primary | ICD-10-CM

## 2024-09-25 DIAGNOSIS — G89.29 CHRONIC RIGHT HIP PAIN: Primary | ICD-10-CM

## 2024-09-25 PROCEDURE — 97161 PT EVAL LOW COMPLEX 20 MIN: CPT | Performed by: PHYSICAL THERAPIST

## 2024-09-25 PROCEDURE — 97535 SELF CARE MNGMENT TRAINING: CPT | Performed by: PHYSICAL THERAPIST

## 2024-09-25 PROCEDURE — 97110 THERAPEUTIC EXERCISES: CPT | Performed by: PHYSICAL THERAPIST

## 2024-10-03 ENCOUNTER — TREATMENT (OUTPATIENT)
Dept: PHYSICAL THERAPY | Facility: CLINIC | Age: 54
End: 2024-10-03
Payer: MEDICARE

## 2024-10-03 DIAGNOSIS — G89.29 CHRONIC RIGHT HIP PAIN: Primary | ICD-10-CM

## 2024-10-03 DIAGNOSIS — M25.551 CHRONIC RIGHT HIP PAIN: Primary | ICD-10-CM

## 2024-10-03 NOTE — PROGRESS NOTES
Physical Therapy Daily Treatment Note  [facility] [address]  [address], [address]  [facility] [address]      Patient: Kelsi Costa   : 1970  Diagnosis/ICD-10 Code:  Chronic right hip pain [M25.551, G89.29]  Referring practitioner: Alin Lewis MD  Date of Initial Visit: Type: THERAPY  Noted: 2024  Today's Date: 10/3/2024  Patient seen for 2 sessions         Visit Diagnoses:    ICD-10-CM ICD-9-CM   1. Chronic right hip pain  M25.551 719.45    G89.29 338.29       Subjective   Kelsi Costa reports: after PT last time, the pain was worse that evening and night.  Right groin pain present most of the time, sitting may be a little worse than other position.  The hip pain started a few days after she fell, landed on right side (2023).  Hip surgery was 2023    Objective   OBSERVATION: Gait slightly antalgic when she first stands up but then improves as she continues to ambulate.  PALPATION: Extremely tender at the right anterior hip joint itself, moderately tender lateral hip joint at the greater trochanter bursa, mild to moderate tenderness lumbar spine.  ROM: Repeated movements of the lumbar spine had no effect on the anterior and medial hip joint pain.  Hip extension limited with endrange discomfort.  Hip internal and external range of motion is within functional limits but reproduced pain of complaint.  STRENGTH: Not assessed  OTHER: Thigh thrust test negative for SI joint.  Loaded hip abduction with rotation reproduces symptoms.    See Exercise, Manual, and Modality Logs for complete treatment.       Assessment/Plan  Suspect the patient may have experienced some chronic injury to soft tissues around the anterior medial right hip joint post arthroplasty when she fell in November.  Lumbar spine did not reproduce symptoms of complaint.  However palpation of the hip joint and movement of the hip joint reproduced symptoms each time.  Long axis  distraction to reduce some of the joint surface contact immediately decreased her pain as well.  We tried some modalities today to see if this would have a positive effect on her symptoms.  Home exercise program emphasis on passive hip extension.  Even though it is not likely to have an impingement with a hip arthroplasty her symptoms are such as a capsular impingement.  Progress per Plan of Care and Progress strengthening /stabilization /functional activity           Timed:         Manual Therapy:    16     mins  41023;     Therapeutic Exercise:    8     mins  12068;     Neuromuscular Amanda:        mins  24877;    Therapeutic Activity:     15     mins  89858;     Gait Training:           mins  45078;     Ultrasound:     15     mins  56640;    Ionto                                   mins   35659  Self Care                            mins   23015  Canalith Repos         mins 07477      Un-Timed:  Electrical Stimulation:    20     mins  68193 ( );  Dry Needling          mins self-pay  Traction          mins 10669      Timed Treatment:   54   mins   Total Treatment:     74   mins    Zheng Brizuela, PT  [place] License: [id number]

## 2024-10-17 ENCOUNTER — TREATMENT (OUTPATIENT)
Dept: PHYSICAL THERAPY | Facility: CLINIC | Age: 54
End: 2024-10-17
Payer: MEDICARE

## 2024-10-17 DIAGNOSIS — M25.551 CHRONIC RIGHT HIP PAIN: Primary | ICD-10-CM

## 2024-10-17 DIAGNOSIS — G89.29 CHRONIC RIGHT HIP PAIN: Primary | ICD-10-CM

## 2024-10-17 PROCEDURE — 97110 THERAPEUTIC EXERCISES: CPT | Performed by: PHYSICAL THERAPIST

## 2024-10-17 PROCEDURE — 97140 MANUAL THERAPY 1/> REGIONS: CPT | Performed by: PHYSICAL THERAPIST

## 2024-10-17 PROCEDURE — 97112 NEUROMUSCULAR REEDUCATION: CPT | Performed by: PHYSICAL THERAPIST

## 2024-10-17 NOTE — PROGRESS NOTES
Physical Therapy Daily Treatment Note  [address]   [place] [address]      Patient: Kelsi Costa   : 1970  Referring practitioner: Alin Lewis MD  Date of Initial Visit: Type: THERAPY  Noted: 2024  Today's Date: 10/17/2024  Patient seen for 3 sessions       Visit Diagnoses:    ICD-10-CM ICD-9-CM   1. Chronic right hip pain  M25.551 719.45    G89.29 338.29       Subjective   Kelsi Costa reports: Needs to leave by 1:40 today. Has been doing ok, less lateral hip pn but ant hip and groin still very sore/tight. Has worked on stretching but not strengthening-doesn't like to do them.    Pre tx pn score: 6  Post tx pn score: 7    Treatment  See Exercise, Manual, and Modality Logs for complete treatment.     Objective         Assessment & Plan       Assessment  Assessment details: Limited tolerance to exercise as ant/medial hip discomfort easily aggravated. Does get some relief w/ LAD of the R hip and hip joint mobilization. Had to defer modalities due to need to leave visit early for other appt. Encouraged to try incorporating strengthening exercises 3-4 days a week, starting w/ low volume (10reps) and increasing gradually as symptoms allow. Pt verbalized understanding.    Plan  Plan details: Cont modalities as indicated for pn control; may incorporate STM over iliopsoas/adductors prior to stretching          Timed:         Manual Therapy:    10     mins  90811;     Therapeutic Exercise:    10     mins  00016;     Neuromuscular Amanad:    20    mins  09002;    Therapeutic Activity:     0     mins  06809;     Gait Trainin     mins  77935;     Ultrasound:     0     mins  26998;    Ionto                               0    mins   42436  Self Care                       0     mins   90884  Canalith Repos    0     mins 17146  Work Conditioning 1-2 hours    0    mins 81646  Work Conditioning ea add'l hour    0   mins 60197    Un-Timed:  Electrical Stimulation:    0      mins  05785 ( );  Dry Needling     0     mins self-pay  Traction     0     mins 38756      Timed Treatment:   40   mins   Total Treatment:     40   mins    Prachi Wang, PT  [place] License: #[id number]

## 2024-10-31 ENCOUNTER — TELEPHONE (OUTPATIENT)
Dept: PHYSICAL THERAPY | Facility: CLINIC | Age: 54
End: 2024-10-31

## 2024-10-31 NOTE — TELEPHONE ENCOUNTER
Caller: Kelsi Costa    Relationship: Self         What was the call regarding: FIBRO IS ACTING UP

## 2025-08-01 ENCOUNTER — OFFICE VISIT (OUTPATIENT)
Dept: ORTHOPEDIC SURGERY | Facility: CLINIC | Age: 55
End: 2025-08-01
Payer: COMMERCIAL

## 2025-08-01 VITALS
DIASTOLIC BLOOD PRESSURE: 88 MMHG | WEIGHT: 226 LBS | SYSTOLIC BLOOD PRESSURE: 156 MMHG | HEIGHT: 64 IN | BODY MASS INDEX: 38.58 KG/M2

## 2025-08-01 DIAGNOSIS — M25.552 LEFT HIP PAIN: ICD-10-CM

## 2025-08-01 DIAGNOSIS — M70.61 TROCHANTERIC BURSITIS OF BOTH HIPS: Primary | ICD-10-CM

## 2025-08-01 DIAGNOSIS — M70.62 TROCHANTERIC BURSITIS OF BOTH HIPS: Primary | ICD-10-CM

## 2025-08-01 DIAGNOSIS — Z96.641 S/P TOTAL RIGHT HIP ARTHROPLASTY: ICD-10-CM

## 2025-08-01 DIAGNOSIS — M16.12 PRIMARY OSTEOARTHRITIS OF LEFT HIP: ICD-10-CM

## 2025-08-01 RX ORDER — DEXAMETHASONE SODIUM PHOSPHATE 4 MG/ML
8 INJECTION, SOLUTION INTRA-ARTICULAR; INTRALESIONAL; INTRAMUSCULAR; INTRAVENOUS; SOFT TISSUE
Status: COMPLETED | OUTPATIENT
Start: 2025-08-01 | End: 2025-08-01

## 2025-08-01 RX ORDER — FUROSEMIDE 20 MG/1
TABLET ORAL
COMMUNITY
Start: 2025-06-25

## 2025-08-01 RX ORDER — LIDOCAINE HYDROCHLORIDE 10 MG/ML
3 INJECTION, SOLUTION EPIDURAL; INFILTRATION; INTRACAUDAL; PERINEURAL
Status: COMPLETED | OUTPATIENT
Start: 2025-08-01 | End: 2025-08-01

## 2025-08-01 RX ORDER — DESVENLAFAXINE 25 MG/1
1 TABLET, EXTENDED RELEASE ORAL DAILY
COMMUNITY
Start: 2025-07-30

## 2025-08-01 RX ORDER — KETOTIFEN FUMARATE 0.35 MG/ML
SOLUTION/ DROPS OPHTHALMIC
COMMUNITY

## 2025-08-01 RX ORDER — QUETIAPINE FUMARATE 50 MG/1
50 TABLET, FILM COATED ORAL
COMMUNITY
Start: 2025-07-30

## 2025-08-01 RX ORDER — BUSPIRONE HYDROCHLORIDE 5 MG/1
5 TABLET ORAL 2 TIMES DAILY PRN
COMMUNITY
Start: 2025-07-30

## 2025-08-01 RX ORDER — IPRATROPIUM BROMIDE 42 UG/1
SPRAY, METERED NASAL
COMMUNITY

## 2025-08-01 RX ORDER — TIRZEPATIDE 5 MG/.5ML
INJECTION, SOLUTION SUBCUTANEOUS
COMMUNITY
Start: 2025-07-16

## 2025-08-01 RX ORDER — OXYCODONE HYDROCHLORIDE 10 MG/1
1 TABLET ORAL EVERY 6 HOURS
COMMUNITY
Start: 2025-07-25

## 2025-08-01 RX ORDER — BUPIVACAINE HYDROCHLORIDE 2.5 MG/ML
3 INJECTION, SOLUTION EPIDURAL; INFILTRATION; INTRACAUDAL; PERINEURAL
Status: COMPLETED | OUTPATIENT
Start: 2025-08-01 | End: 2025-08-01

## 2025-08-01 RX ORDER — DILTIAZEM HYDROCHLORIDE 120 MG/1
1 CAPSULE, COATED, EXTENDED RELEASE ORAL DAILY
COMMUNITY
Start: 2025-07-16

## 2025-08-01 RX ORDER — HYDROCODONE BITARTRATE AND ACETAMINOPHEN 7.5; 325 MG/1; MG/1
TABLET ORAL AS NEEDED
COMMUNITY
Start: 2025-07-25

## 2025-08-01 RX ORDER — CHLORCYCLIZINE HYDROCHLORIDE AND PSEUDOEPHEDRINE HYDROCHLORIDE 25; 60 MG/1; MG/1
TABLET ORAL
COMMUNITY
Start: 2025-04-23

## 2025-08-01 RX ADMIN — BUPIVACAINE HYDROCHLORIDE 3 ML: 2.5 INJECTION, SOLUTION EPIDURAL; INFILTRATION; INTRACAUDAL; PERINEURAL at 10:58

## 2025-08-01 RX ADMIN — DEXAMETHASONE SODIUM PHOSPHATE 8 MG: 4 INJECTION, SOLUTION INTRA-ARTICULAR; INTRALESIONAL; INTRAMUSCULAR; INTRAVENOUS; SOFT TISSUE at 10:58

## 2025-08-01 RX ADMIN — LIDOCAINE HYDROCHLORIDE 3 ML: 10 INJECTION, SOLUTION EPIDURAL; INFILTRATION; INTRACAUDAL; PERINEURAL at 10:58

## 2025-08-01 NOTE — PROGRESS NOTES
Orthopaedic Clinic Note: Hip Established Patient    Chief Complaint   Patient presents with    Left Hip - Pain    Follow-up     11 month follow up - Trochanteric bursitis of right hip         HPI    It has been 10  month(s) since Ms. Costa's last visit. She returns to clinic today for follow-up right hip pain as well as new complaint of left hip pain.  Patient was last seen 10 months ago and underwent trochanteric bursa cortisone injection in the right hip.  The injection provided about 9 months of relief before pain returned.  In addition to this she is now complaining of left hip pain in a similar pain distribution localizing to the lateral trochanter that has been ongoing for 4 months.  Her pain is a 6/10 on the pain scale.  She is ambulating with no assistive device.  Denies fevers chills or constitutional symptoms.  Overall she is doing about the same.    Past Medical History:   Diagnosis Date    Angina at rest     Arthritis of neck     Asthma     Back pain     Bursitis of hip     Cervical disc disorder     Diabetes mellitus     Elevated cholesterol     Fibromyalgia     Headache     Hip arthrosis     Hyperlipidemia     Hypertension     Knee swelling     Low back strain     Lumbosacral disc disease     Mitral valve prolapse     Sleep apnea     CPAP compliant, auto pressure adjustment per patient    Tear of meniscus of knee     Tendinitis of knee     Vertigo     Wears glasses       Past Surgical History:   Procedure Laterality Date    CARDIAC CATHETERIZATION N/A 2017    Procedure: Left Heart Cath;  Surgeon: Darnell Allen MD;  Location: [facility] CATH INVASIVE LOCATION;  Service:      SECTION      COLONOSCOPY      ESSURE TUBAL LIGATION      FEMORAL ARTERY REPAIR Right     HYSTERECTOMY      JOINT REPLACEMENT Bilateral     L knee 2019 R knee ? 2008    KNEE ARTHROPLASTY, PARTIAL REPLACEMENT Left     TOTAL HIP ARTHROPLASTY Right 2023    Procedure:  TOTAL HIP ARTHROPLASTY - RIGHT;  Surgeon: Alin Lewis MD;  [facility];  Service: Orthopedics;  Laterality: Right;    TOTAL KNEE ARTHROPLASTY Right     TRIGGER POINT INJECTION      May 2022 in R hip      Family History   Problem Relation Age of Onset    Asthma Mother     Arthritis Mother     Anesthesia problems Mother     Cancer Mother     Hypertension Father     Arthritis Father     Diabetes Father     Heart disease Brother     Scoliosis Brother     Diabetes Maternal Grandmother     Osteoporosis Maternal Grandmother      Social History     Socioeconomic History    Marital status:    Tobacco Use    Smoking status: Some Days     Current packs/day: 0.00     Average packs/day: 0.3 packs/day for 6.0 years (1.5 ttl pk-yrs)     Types: Cigars, Cigarettes     Start date: 2017     Last attempt to quit: 2023     Years since quittin.1     Passive exposure: Current    Smokeless tobacco: Never   Vaping Use    Vaping status: Never Used   Substance and Sexual Activity    Alcohol use: Yes     Alcohol/week: 1.0 standard drink of alcohol     Types: 1 Glasses of wine per week     Comment: Occasionally    Drug use: No    Sexual activity: Not Currently     Partners: Male     Birth control/protection: Hysterectomy      Current Outpatient Medications on File Prior to Visit   Medication Sig Dispense Refill    amitriptyline (ELAVIL) 10 MG tablet Take 1 tablet by mouth Every Night.      atorvastatin (LIPITOR) 80 MG tablet Take 1 tablet by mouth Every Night.      azelastine (ASTELIN) 0.1 % nasal spray Administer 1 spray into the nostril(s) as directed by provider 4 (Four) Times a Day As Needed.      azelastine (OPTIVAR) 0.05 % ophthalmic solution 1 drop.      busPIRone (BUSPAR) 5 MG tablet Take 1 tablet by mouth 2 (Two) Times a Day As Needed. for anxiety      cholecalciferol (VITAMIN D3) 25 MCG (1000 UT) tablet Take 1 tablet by mouth Daily.      Desvenlafaxine Succinate ER 25 MG tablet sustained-release 24  hour Take 1 tablet by mouth Daily.      dilTIAZem CD (CARDIZEM CD) 120 MG 24 hr capsule Take 1 capsule by mouth Daily.      estradiol (ESTRACE) 1 MG tablet Take 1 tablet by mouth Daily.      fluconazole (DIFLUCAN) 150 MG tablet 1 tablet.      fluticasone (FLONASE) 50 MCG/ACT nasal spray Administer 2 sprays into the nostril(s) as directed by provider Daily.      furosemide (LASIX) 20 MG tablet TAKE 1 TABLET BY MOUTH ONCE A DAY FOR SWELLING      gabapentin (NEURONTIN) 800 MG tablet Take 1 tablet by mouth 2 (two) times a day.      HYDROcodone-acetaminophen (NORCO) 7.5-325 MG per tablet As Needed.       MG tablet       ipratropium (ATROVENT) 0.06 % nasal spray       Ketotifen Fumarate (ZADITOR) 0.035 % solution       levocetirizine (XYZAL) 5 MG tablet Take 1 tablet by mouth Every Evening.      losartan-hydrochlorothiazide (HYZAAR) 100-25 MG per tablet Take 1 tablet by mouth Every Evening.      metFORMIN (GLUCOPHAGE) 500 MG tablet Take 1 tablet by mouth 2 (Two) Times a Day With Meals. Patient takes only once daily instead of twice daily      Mounjaro 5 MG/0.5ML solution auto-injector INJECT 0.5 MLS SUBCUTANEOUSLY EVERY WEEK      naloxone (NARCAN) 4 MG/0.1ML nasal spray Call 911. Don't prime. [place] in 1 nostril for overdose. Repeat in 2-3 minutes in other nostril if no or minimal breathing/responsiveness. 2 each 0    nitroglycerin (NITROSTAT) 0.4 MG SL tablet Place 1 tablet under the tongue Every 5 (Five) Minutes As Needed for Chest Pain. Take no more than 3 doses in 15 minutes. 90 tablet 0    ondansetron ODT (ZOFRAN-ODT) 4 MG disintegrating tablet Place 1 tablet on the tongue As Needed for Nausea or Vomiting.      oxyCODONE (ROXICODONE) 10 MG tablet Take 1 tablet by mouth Every 6 (Six) Hours.      potassium chloride 10 MEQ CR tablet Take 1 tablet by mouth Daily.      QUEtiapine (SEROquel) 50 MG tablet Take 1 tablet by mouth every night at bedtime.      ranolazine (RANEXA) 500 MG 12 hr tablet Take 1 tablet by mouth  "2 (Two) Times a Day. Patient states she takes both tablets together QPM instead of 1 tablet BID      Stahist AD 25-60 MG tablet TAKE 1 TABLET BY MOUTH EVERY 6 TO 8 HOURS AS NEEDED FOR ALLERGIES AND CONGESTION *DO NOT EXCEED 3 TABLETS IN 24 HOURS*      topiramate (TOPAMAX) 50 MG tablet       Ventolin  (90 Base) MCG/ACT inhaler Inhale 2 puffs See Admin Instructions. Inhale 2 puffs by mouth every 4 to 6 hours as needed      vitamin B-12 (CYANOCOBALAMIN) 1000 MCG tablet Take 1 tablet by mouth Daily.      [DISCONTINUED] diltiaZEM (CARDIZEM) 60 MG tablet Take 1 tablet by mouth Every Evening.      [DISCONTINUED] Movantik 25 MG tablet TAKE 1/2 TABLET BY MOUTH EVERY DAY FOR CONSTIPATION      [DISCONTINUED] oxyCODONE-acetaminophen (PERCOCET)  MG per tablet Take 1 tablet by mouth.      [DISCONTINUED] Ozempic, 1 MG/DOSE, 4 MG/3ML solution pen-injector Inject 1 mg under the skin into the appropriate area as directed 1 (One) Time Per Week. Mondays       No current facility-administered medications on file prior to visit.      Allergies   Allergen Reactions    Levaquin [Levofloxacin] Itching and Rash        Review of Systems   Constitutional: Negative.    HENT: Negative.     Eyes: Negative.    Respiratory: Negative.     Cardiovascular: Negative.    Gastrointestinal: Negative.    Endocrine: Negative.    Genitourinary: Negative.    Musculoskeletal:  Positive for arthralgias.   Skin: Negative.    Allergic/Immunologic: Negative.    Neurological: Negative.    Hematological: Negative.    Psychiatric/Behavioral: Negative.          The patient's Review of Systems was personally reviewed and confirmed as accurate.    Physical Exam  Blood pressure 156/88, height 162.6 cm (64.02\"), weight 103 kg (226 lb).    Body mass index is 38.77 kg/m².    GENERAL APPEARANCE: awake, alert, oriented, in no acute distress and well developed, well nourished  LUNGS:  breathing nonlabored  EXTREMITIES: no clubbing, cyanosis  PERIPHERAL PULSES: " palpable dorsalis pedis and posterior tibial pulses bilaterally.    GAIT:  Normal            Hip Exam: Bilateral     RANGE OF MOTION:  EXTENSION/FLEXION:  normal (0-110 degrees)  IR:  20  ER:  40  PAIN WITH HIP MOTION:  no  PAIN WITH LOGROLL:  no      STRENGTH:  ABDUCTOR:    5/5  ADDUCTOR:  5/5  HIP FLEXION:  5/5     GREATER TROCHANTER BURSAL PAIN:  yes    SENSATION TO LIGHT TOUCH:  DEEP PERONEAL/SUPERFICIAL PERONEAL/SURAL/SAPHENOUS/TIBIAL:   intact    EDEMA:  no  ERYTHEMA:  no  WOUNDS/INCISIONS:   yes, well healed surgical incision without evidence of erythema or drainage on right  _________________________________________________________________  _________________________________________________________________    RADIOGRAPHIC FINDINGS:   Indication: Left hip pain    Comparison: Todays xrays were compared to previous xrays from 9/3/2024    AP pelvis: Right: Demonstrate a well positioned total hip without evidence of wear, loosening, fracture or osteolysis, femoral head is concentrically reduced within the acetabulum and No significant changes compared to prior radiographs.;Left: mild joint space narrowing, minimal osteophyte formation and No significant changes compared to prior radiographs.      Assessment/Plan:   Diagnosis Plan   1. Trochanteric bursitis of both hips  XR Hip With or Without Pelvis 2 - 3 View Left      2. Left hip pain  XR Hip With or Without Pelvis 2 - 3 View Left      3. S/P total right hip arthroplasty        4. Primary osteoarthritis of left hip          Patient is suffering from trochanteric bursitis of the right hip as well as left hip.  She has mild arthritis of the left hip that is asymptomatic on exam today with well-preserved and pain-free range of motion.  I discussed home exercise program for hip strengthening and IT band stretching.  She is agreeable to this.  She is also agreeable to bilateral hip trochanteric bursa cortisone injections today.  She will follow-up as  needed.    Procedure Note:  I discussed with the patient the potential benefits of performing a therapeutic injections of the bilateral hip trochanteric bursa as well as potential risks including but not limited to infection, swelling, pain, bleeding, bruising, nerve/vessel damage, skin color changes, transient elevation in blood glucose levels, and fat atrophy. After informed consent and verifying correct patient, procedure site, and type of procedure, the areas were prepped with alcohol, ethyl chloride was used to numb the skin. Via the direct lateral approach, 3 cc of 1% lidocaine, 3 cc of 0.25% bupivicaine, and 2 cc of 4 mg/ml of Dexamethasone were each injected into the bilateral hip trochanteric bursa. The patient tolerated the procedures well. There were no complications. A sterile dressing was placed over each injection site.    Alin Lewis MD  08/01/25  10:56 EDT

## 2025-08-01 NOTE — PROGRESS NOTES
Procedure   - Large Joint Arthrocentesis: bilateral greater trochanteric bursa on 8/1/2025 10:58 AM  Indications: pain  Details: 22 G (22G spinal) needle, lateral approach  Medications (Right): 3 mL lidocaine PF 1% 1 %; 3 mL bupivacaine (PF) 0.25 %; 8 mg dexAMETHasone 4 MG/ML  Medications (Left): 3 mL lidocaine PF 1% 1 %; 3 mL bupivacaine (PF) 0.25 %; 8 mg dexAMETHasone 4 MG/ML  Outcome: tolerated well, no immediate complications  Procedure, treatment alternatives, risks and benefits explained, specific risks discussed. Consent was given by the patient. Immediately prior to procedure a time out was called to verify the correct patient, procedure, equipment, support staff and site/side marked as required. Patient was prepped and draped in the usual sterile fashion.

## (undated) DEVICE — A2000 MULTI-USE SYRINGE KIT, P/N 701277-003KIT CONTENTS: 100ML CONTRAST RESERVOIR AND TUBING WITH CONTRAST SPIKE AND CLAMP: Brand: A2000 MULTI-USE SYRINGE KIT

## (undated) DEVICE — PK CATH CARD 10

## (undated) DEVICE — CATH DIAG EXPO .045 FL3  5F 100CM

## (undated) DEVICE — GW INQWIRE FC PTFE STD J/1.5 .035 260

## (undated) DEVICE — DEV COMP RAD PRELUDESYNC 24CM

## (undated) DEVICE — MODEL AT P54, P/N 700608-035KIT CONTENTS: HAND CONTROLLER, 3-WAY HIGH-PRESSURE STOPCOCK WITH ROTATING END AND PREMIUM HIGH-PRESSURE TUBING: Brand: ANGIOTOUCH® KIT

## (undated) DEVICE — CATH DIAG EXPO M/ PK 5F FL4/FR4 PIG

## (undated) DEVICE — MODEL BT2000 P/N 700287-012KIT CONTENTS: MANIFOLD WITH SALINE AND CONTRAST PORTS, SALINE TUBING WITH SPIKE AND HAND SYRINGE, TRANSDUCER: Brand: BT2000 AUTOMATED MANIFOLD KIT

## (undated) DEVICE — GLIDESHEATH SLENDER STAINLESS STEEL KIT: Brand: GLIDESHEATH SLENDER